# Patient Record
Sex: FEMALE | Race: WHITE | NOT HISPANIC OR LATINO | Employment: FULL TIME | URBAN - METROPOLITAN AREA
[De-identification: names, ages, dates, MRNs, and addresses within clinical notes are randomized per-mention and may not be internally consistent; named-entity substitution may affect disease eponyms.]

---

## 2019-06-14 ENCOUNTER — HOSPITAL ENCOUNTER (OUTPATIENT)
Dept: OTHER | Facility: HOSPITAL | Age: 24
Discharge: HOME | End: 2019-06-14

## 2019-08-01 ENCOUNTER — HOSPITAL ENCOUNTER (OUTPATIENT)
Dept: OTHER | Facility: HOSPITAL | Age: 24
Discharge: HOME | End: 2019-08-01
Attending: NURSE PRACTITIONER

## 2020-02-13 ENCOUNTER — OFFICE VISIT CONVERTED (OUTPATIENT)
Dept: INTERNAL MEDICINE | Facility: CLINIC | Age: 25
End: 2020-02-13
Attending: NURSE PRACTITIONER

## 2020-02-19 ENCOUNTER — OFFICE VISIT CONVERTED (OUTPATIENT)
Dept: INTERNAL MEDICINE | Facility: CLINIC | Age: 25
End: 2020-02-19
Attending: NURSE PRACTITIONER

## 2020-03-24 ENCOUNTER — HOSPITAL ENCOUNTER (OUTPATIENT)
Dept: OTHER | Facility: HOSPITAL | Age: 25
Discharge: HOME | End: 2020-03-24
Attending: NURSE PRACTITIONER

## 2020-03-24 ENCOUNTER — OFFICE VISIT CONVERTED (OUTPATIENT)
Dept: INTERNAL MEDICINE | Facility: CLINIC | Age: 25
End: 2020-03-24
Attending: NURSE PRACTITIONER

## 2020-03-26 LAB — BACTERIA SPEC AEROBE CULT: NORMAL

## 2020-06-15 ENCOUNTER — CONVERSION ENCOUNTER (OUTPATIENT)
Dept: INTERNAL MEDICINE | Facility: CLINIC | Age: 25
End: 2020-06-15

## 2020-06-15 ENCOUNTER — OFFICE VISIT CONVERTED (OUTPATIENT)
Dept: INTERNAL MEDICINE | Facility: CLINIC | Age: 25
End: 2020-06-15
Attending: INTERNAL MEDICINE

## 2020-09-10 ENCOUNTER — OFFICE VISIT CONVERTED (OUTPATIENT)
Dept: INTERNAL MEDICINE | Facility: CLINIC | Age: 25
End: 2020-09-10
Attending: PHYSICIAN ASSISTANT

## 2021-01-09 ENCOUNTER — HOSPITAL ENCOUNTER (OUTPATIENT)
Dept: URGENT CARE | Facility: CLINIC | Age: 26
Discharge: HOME | End: 2021-01-09
Attending: PHYSICIAN ASSISTANT

## 2021-01-11 LAB — SARS-COV-2 RNA SPEC QL NAA+PROBE: DETECTED

## 2021-01-26 ENCOUNTER — HOSPITAL ENCOUNTER (OUTPATIENT)
Dept: OTHER | Facility: HOSPITAL | Age: 26
Discharge: HOME | End: 2021-01-26
Attending: PHYSICIAN ASSISTANT

## 2021-01-26 ENCOUNTER — OFFICE VISIT CONVERTED (OUTPATIENT)
Dept: INTERNAL MEDICINE | Facility: CLINIC | Age: 26
End: 2021-01-26
Attending: PHYSICIAN ASSISTANT

## 2021-01-26 LAB
ALBUMIN SERPL-MCNC: 4.2 G/DL (ref 3.5–5)
ALBUMIN/GLOB SERPL: 1.6 {RATIO} (ref 1.4–2.6)
ALP SERPL-CCNC: 70 U/L (ref 42–98)
ALT SERPL-CCNC: 12 U/L (ref 10–40)
ANION GAP SERPL CALC-SCNC: 14 MMOL/L (ref 8–19)
AST SERPL-CCNC: 21 U/L (ref 15–50)
BASOPHILS # BLD AUTO: 0.06 10*3/UL (ref 0–0.2)
BASOPHILS NFR BLD AUTO: 0.8 % (ref 0–3)
BILIRUB SERPL-MCNC: 0.53 MG/DL (ref 0.2–1.3)
BUN SERPL-MCNC: 7 MG/DL (ref 5–25)
BUN/CREAT SERPL: 10 {RATIO} (ref 6–20)
CALCIUM SERPL-MCNC: 9 MG/DL (ref 8.7–10.4)
CHLORIDE SERPL-SCNC: 102 MMOL/L (ref 99–111)
CONV ABS IMM GRAN: 0.01 10*3/UL (ref 0–0.2)
CONV CO2: 28 MMOL/L (ref 22–32)
CONV IMMATURE GRAN: 0.1 % (ref 0–1.8)
CONV TOTAL PROTEIN: 6.8 G/DL (ref 6.3–8.2)
CREAT UR-MCNC: 0.72 MG/DL (ref 0.5–0.9)
DEPRECATED RDW RBC AUTO: 41.3 FL (ref 36.4–46.3)
EOSINOPHIL # BLD AUTO: 0.19 10*3/UL (ref 0–0.7)
EOSINOPHIL # BLD AUTO: 2.6 % (ref 0–7)
ERYTHROCYTE [DISTWIDTH] IN BLOOD BY AUTOMATED COUNT: 13.1 % (ref 11.7–14.4)
GFR SERPLBLD BASED ON 1.73 SQ M-ARVRAT: >60 ML/MIN/{1.73_M2}
GLOBULIN UR ELPH-MCNC: 2.6 G/DL (ref 2–3.5)
GLUCOSE SERPL-MCNC: 85 MG/DL (ref 65–99)
HCT VFR BLD AUTO: 36.3 % (ref 37–47)
HGB BLD-MCNC: 11.9 G/DL (ref 12–16)
LYMPHOCYTES # BLD AUTO: 2.65 10*3/UL (ref 1–5)
LYMPHOCYTES NFR BLD AUTO: 36.8 % (ref 20–45)
MCH RBC QN AUTO: 28.7 PG (ref 27–31)
MCHC RBC AUTO-ENTMCNC: 32.8 G/DL (ref 33–37)
MCV RBC AUTO: 87.7 FL (ref 81–99)
MONOCYTES # BLD AUTO: 0.52 10*3/UL (ref 0.2–1.2)
MONOCYTES NFR BLD AUTO: 7.2 % (ref 3–10)
NEUTROPHILS # BLD AUTO: 3.77 10*3/UL (ref 2–8)
NEUTROPHILS NFR BLD AUTO: 52.5 % (ref 30–85)
NRBC CBCN: 0 % (ref 0–0.7)
OSMOLALITY SERPL CALC.SUM OF ELEC: 287 MOSM/KG (ref 273–304)
PLATELET # BLD AUTO: 209 10*3/UL (ref 130–400)
PMV BLD AUTO: 11.3 FL (ref 9.4–12.3)
POTASSIUM SERPL-SCNC: 3.9 MMOL/L (ref 3.5–5.3)
RBC # BLD AUTO: 4.14 10*6/UL (ref 4.2–5.4)
SODIUM SERPL-SCNC: 140 MMOL/L (ref 135–147)
TSH SERPL-ACNC: 1.56 M[IU]/L (ref 0.27–4.2)
WBC # BLD AUTO: 7.2 10*3/UL (ref 4.8–10.8)

## 2021-01-27 LAB
IRON SATN MFR SERPL: 13 % (ref 20–55)
IRON SERPL-MCNC: 55 UG/DL (ref 60–170)
TIBC SERPL-MCNC: 412 UG/DL (ref 245–450)
TRANSFERRIN SERPL-MCNC: 288 MG/DL (ref 250–380)

## 2021-05-13 NOTE — PROGRESS NOTES
Progress Note      Patient Name: Paradise Wilson   Patient ID: [id number]   Sex: Female   YOB: 1995    Primary Care Provider: Ama GUZMÁN   Referring Provider: Ama GUZMÁN    Visit Date: Malissa 15, 2020    Provider: Michelle Medrano MD   Location: [facility]   Location Address: [address] [place] [address]  [id number]   Location Phone: [phone]          Chief Complaint  · Establish Care with       History Of Present Illness  Paradise Wilson is a 25 year old /White female who presents for evaluation and treatment of:      Establish care with Dr Medrano    Previously saw Ama [place]    Bell's Palsy resolved    2 children, 1 and 4, , lives in [place]   is a   2 dogs  works at [employer] in [place]           Past Medical History  Disease Name Date Onset Notes   ***No Significant Medical History --  --    Dysmenorrhea --  --          Past Surgical History  Procedure Name Date Notes   [place] Tooth Extraction --  --          Medication List  Name Date Started Instructions   Bromfed DM 2-30-10 mg/5 mL oral syrup 03/24/2020 take 10 milliliters by oral route every 4 hours as needed   Flonase Allergy Relief 50 mcg/actuation nasal spray,suspension 03/24/2020 spray 1 spray (50 mcg) in each nostril by intranasal route once daily   Zyrtec 10 mg oral tablet 03/24/2020 take 1 tablet (10 mg) by oral route once daily         Allergy List  Allergen Name Date Reaction Notes   NO KNOWN DRUG ALLERGIES --  --  --          Family Medical History  Disease Name Relative/Age Notes   Stroke Uncle/   --    Heart Disease Grandmother (maternal)/   --    Diabetes Mellitus, Type II Grandmother (maternal)/   --          Reproductive History  Menstrual   Last Menstrual Period: 10/09/2013 Method of Birth Control: OCPs   Pregnancy Summary   Total Pregnancies: 0 Full Term: 0 Premature: 0   Ab Induced: 0 Ab Spontaneous: 0 Ectopics: 0   Multiples:  0 Livin         Social History  Finding Status Start/Stop Quantity Notes   Alcohol Never 0/0 --  drinks no   Caffeine Current every day 0/0 --  drinks regularly; tea and soft drinks; 3-4 times per day   Second hand smoke exposure Never 0/0 --  no   Tobacco Never 0/0 --  never a smoker         Review of Systems  · Constitutional  o Denies  o : fever, fatigue, weight loss, weight gain  · Cardiovascular  o Denies  o : lower extremity edema, claudication, chest pressure, palpitations  · Respiratory  o Denies  o : shortness of breath, wheezing, cough, hemoptysis, dyspnea on exertion  · Gastrointestinal  o Denies  o : nausea, vomiting, diarrhea, constipation, abdominal pain      Vitals  Date Time BP Position Site L\R Cuff Size HR RR TEMP (F) WT  HT  BMI kg/m2 BSA m2 O2 Sat HC       2020 03:30 /68 Sitting    93 - R  98.3 152lbs 2oz 5'   29.71 1.71 99 %    2020 01:21 /62 Sitting    90 - R  97.4 157lbs 16oz 5'   30.86 1.74 100 %    06/15/2020 04:03 /64 Sitting    60 - R  97.7 151lbs 4oz 5'   29.54 1.7 100 %          Physical Examination  · Constitutional  o Appearance  o : no acute distress, well-nourished  · Head and Face  o Head  o :   § Inspection  § : atraumatic, normocephalic  · Eyes  o Eyes  o : extraocular movements intact, no scleral icterus, no conjunctival injection  · Ears, Nose, Mouth and Throat  o Ears  o :   § External Ears  § : normal  o Nose  o :   § Intranasal Exam  § : nares patent  o Oral Cavity  o :   § Oral Mucosa  § : moist mucous membranes  · Respiratory  o Respiratory Effort  o : breathing comfortably, symmetric chest rise  o Auscultation of Lungs  o : clear to asculatation bilaterally, no wheezes, rales, or rhonchii  · Cardiovascular  o Heart  o :   § Auscultation of Heart  § : regular rate and rhythm, no murmurs, rubs, or gallops  o Peripheral Vascular System  o :   § Extremities  § : no edema  · Neurologic  o Mental Status Examination  o :   § Orientation  § :  grossly oriented to person, place and time  o Gait and Station  o :   § Gait Screening  § : normal gait  · Psychiatric  o General  o : normal mood and affect          Assessment  · Dysmenorrhea     625.3/N94.6  · Establishing care with new doctor, encounter for     V65.8/Z76.89    Problems Reconciled  Plan  · Orders  o ACO-39: Current medications updated and reviewed () - - 06/15/2020  · Medications  o Medications have been Reconciled  o Transition of Care or Provider Policy  · Instructions  o Patient was educated/instructed on their diagnosis, treatment and medications prior to discharge from the clinic today.  o Call the office with any concerns or questions.  · Disposition  o Follow up in 1 year for annual exam or sooner if needed            Electronically Signed by: Michelle Medrano MD -Author on June 17, 2020 11:21:34 AM

## 2021-05-13 NOTE — PROGRESS NOTES
Progress Note      Patient Name: Praadise Wilson   Patient ID: [id number]   Sex: Female   YOB: 1995    Primary Care Provider: Ama GUZMÁN   Referring Provider: Ama GUZMÁN    Visit Date: September 10, 2020    Provider: Janeth Carrington PA-C   [facility] [facility]   Location Address: [address] [place] [address], [facility]  [place], [place]  [id number]   Location Phone: [phone]          Chief Complaint  · possible allergic reaction      History Of Present Illness  Paradise Wilson is a 25 year old /White female who presents for evaluation and treatment of:      hives that started 2 days ago. She states she was at work and didn't notice it at first because she just had itching under her bra line on the right side. She states she was itchy and noticed the next morning that her whole right side was broken out. She states it has been spreading from her right torso down her right leg. She has been using a Hydrocortisone cream OTC and taking Benadryl as needed, but it is not helping. She states the Hydrocortisone seems like it's making it worse and burns when she's putting it on. She states the only thing she can think of that has been changed recently is her detergent. She said she ordered detergent online and ended up with a different one than she usually uses. She states when she scratches it she thinks it makes it worse. She states she feels like she is really dehydrated. She states she has been really thirsty. She has children and said they don't have a rash, but that her daughter has a reaction to certain detergents. Denies fever, swelling of lips and tongue, previous allergic reaction. Denies sob, wheezing, resp distress, cp. Denies changes in diet, changes in body wash/shampoo, new clothes, no recent outdoor activities.       Past Medical History  Disease Name Date Onset Notes   ***No Significant Medical History --  --    Dysmenorrhea --  --          Past Surgical  History  Procedure Name Date Notes   Tubal ligation  --    [place] Tooth Extraction --  --          Medication List  Name Date Started Instructions   Flonase Allergy Relief 50 mcg/actuation nasal spray,suspension 2020 spray 1 spray (50 mcg) in each nostril by intranasal route once daily         Allergy List  Allergen Name Date Reaction Notes   NO KNOWN DRUG ALLERGIES --  --  --        Allergies Reconciled  Family Medical History  Disease Name Relative/Age Notes   Stroke Uncle/   --    Heart Disease Grandmother (maternal)/   --    Diabetes Mellitus, Type II Grandmother (maternal)/   --          Reproductive History  Menstrual   Last Menstrual Period: 10/09/2013 Method of Birth Control: OCPs   Pregnancy Summary   Total Pregnancies: 0 Full Term: 0 Premature: 0   Ab Induced: 0 Ab Spontaneous: 0 Ectopics: 0   Multiples: 0 Livin         Social History  Finding Status Start/Stop Quantity Notes   Alcohol Never 0/0 --  drinks no   Caffeine Current every day 0/0 --  drinks regularly; tea and soft drinks; 3-4 times per day   Second hand smoke exposure Never 0/0 --  no   Tobacco Never 0/0 --  never a smoker         Review of Systems  · Constitutional  o Denies  o : fever, fatigue  · Eyes  o Denies  o : blurred vision, changes in vision  · HENT  o Denies  o : headaches, nasal congestion, sore throat  · Cardiovascular  o Denies  o : chest pain, palpitations  · Respiratory  o Denies  o : shortness of breath, frequent cough  · Gastrointestinal  o Denies  o : nausea, vomiting, diarrhea, constipation, abdominal pain  · Genitourinary  o Denies  o : urinary urgency, dysuria  · Integument  o Admits  o : rash, itching, pigment changes  · Neurologic  o Denies  o : tingling or numbness, dizziness  · Musculoskeletal  o Denies  o : joint pain, limited range of motion, hip pain      Vitals  Date Time BP Position Site L\R Cuff Size HR RR TEMP (F) WT  HT  BMI kg/m2 BSA m2 O2 Sat HC       2020 01:21 /62 Sitting    90 - R   97.4 157lbs 16oz 5'   30.86 1.74 100 %    06/15/2020 04:03 /64 Sitting    60 - R  97.7 151lbs 4oz 5'   29.54 1.7 100 %    09/10/2020 02:46 /64 Sitting    58 - R 15 98.3 152lbs 4oz 5'   29.73 1.71 100 %          Physical Examination  · Constitutional  o Appearance  o : no acute distress, well-nourished  · Head and Face  o Head  o :   § Inspection  § : atraumatic, normocephalic  · Eyes  o Eyes  o : extraocular movements intact, no scleral icterus, no conjunctival injection  · Ears, Nose, Mouth and Throat  o Ears  o :   § External Ears  § : normal  o Nose  o :   § Intranasal Exam  § : nares patent  o Oral Cavity  o :   § Oral Mucosa  § : moist mucous membranes  · Respiratory  o Respiratory Effort  o : breathing comfortably, symmetric chest rise  o Auscultation of Lungs  o : clear to asculatation bilaterally, no wheezes, rales, or rhonchii  · Cardiovascular  o Heart  o :   § Auscultation of Heart  § : regular rate and rhythm, no murmurs, rubs, or gallops  o Peripheral Vascular System  o :   § Extremities  § : no edema  · Gastrointestinal  o Abdominal Examination  o :   § Abdomen  § : bowel sounds present, non-distended, non-tender  · Skin and Subcutaneous Tissue  o General Inspection  o : various size erythematous urticaria noted along R side of abdomen and R leg  · Neurologic  o Mental Status Examination  o :   § Orientation  § : grossly oriented to person, place and time  o Gait and Station  o :   § Gait Screening  § : normal gait  · Psychiatric  o General  o : normal mood and affect              Assessment  · Rash and nonspecific skin eruption     782.1/R21  Discussed symptoms with patient. Prescribed Solumedrol injection for patient in clinic today. Encouraged patient to take oral Prednisone over the weekend if her rash has not cleared up by Saturday this weekend. Cont PO benadryl prn itching. Patient will call if symptoms have not improved with conservative tx. Patient understood and agreed with plan.    · Urticaria     708.9/L50.9    Problems Reconciled  Plan  · Orders  o IM/SQ - Injection Fee [facility] ([id number]) - - 09/10/2020  o ACO-39: Current medications updated and reviewed () - - 09/10/2020  o Solumedrol 125 () - - 09/10/2020   Injection - Solu-Medrol 125 mg; Dose: 125 mg; Site: Right Hip; Route: intramuscular; Date: 09/10/2020 15:25:55; Exp: 07/31/2022; Lot: [id number]; Mfg: [employer]; TradeName: Solu-Medrol; Location: [facility]; Administered By: Liss Amador MA; Comment: patient tolerated well, left office in stable condition  · Medications  o prednisone 20 mg oral tablet   SIG: take 2 tablets (40 mg) by oral route once daily for 5 days   DISP: (10) tablets with 0 refills  Prescribed on 09/10/2020     o Medications have been Reconciled  o Transition of Care or Provider Policy  · Instructions  o Handouts were given to patient: hives  o Take all medications as prescribed/directed.  o Patient was educated/instructed on their diagnosis, treatment and medications prior to discharge from the clinic today.  o Call the office with any concerns or questions.  o Electronically Identified Patient Education Materials Provided Electronically  · Disposition  o Call or Return if symptoms worsen or persist.  o Prescriptions sent electronically            Electronically Signed by: Janeth Carrington PA-C -Author on September 10, 2020 04:25:25 PM

## 2021-05-14 VITALS
OXYGEN SATURATION: 99 % | TEMPERATURE: 98 F | BODY MASS INDEX: 29.25 KG/M2 | HEIGHT: 60 IN | DIASTOLIC BLOOD PRESSURE: 64 MMHG | SYSTOLIC BLOOD PRESSURE: 116 MMHG | WEIGHT: 149 LBS | HEART RATE: 80 BPM

## 2021-05-14 VITALS
WEIGHT: 152.25 LBS | HEART RATE: 58 BPM | SYSTOLIC BLOOD PRESSURE: 112 MMHG | BODY MASS INDEX: 29.89 KG/M2 | RESPIRATION RATE: 15 BRPM | TEMPERATURE: 98.3 F | OXYGEN SATURATION: 100 % | DIASTOLIC BLOOD PRESSURE: 64 MMHG | HEIGHT: 60 IN

## 2021-05-14 NOTE — PROGRESS NOTES
"   Progress Note      Patient Name: Paradise Wilson   Patient ID: [id number]   Sex: Female   YOB: 1995    Primary Care Provider: Ama GUZMÁN   Referring Provider: Ama GUZMÁN    Visit Date: January 26, 2021    Provider: Anali Bueno PA-C   [place] [facility]   Location Address: [address] [place] [address]  [id number]   Location Phone: [phone]          Chief Complaint  · Back pain since having COVID   · \"er on friday, pins and needles in head to left arm, cramp in back of head\"      History Of Present Illness  Paradise Wilson is a 25 year old /White female who presents for evaluation and treatment of:      Headache.  Patient states that approximately 3 weeks ago she was diagnosed with Covid.  She states approximately 5 days ago she developed numbness and tingling on the left side of her face and down her left arm.  She was seen in the ER.  CT and MRI of brain was done in ER which came back normal.  She was slightly anemic but no other lab abnormalities.  Patient states that her symptoms have improved some but she still has some numbness and tingling mostly on the parietal region on the left side of her head.  Patient does admit to having Bell's palsy on the left side of her face last year.  Her symptoms resolved after a couple weeks.  She has had no other trouble with it since.  Patient states that she has a history of migraines and headaches.  She usually will get a headache at least every other day.  She has taken Tylenol and ibuprofen which did not seem to help much.  She denies visual changes.  She has seen an eye doctor within the past year.  No weakness in her legs.       Past Medical History  Disease Name Date Onset Notes   ***No Significant Medical History --  --    Dysmenorrhea --  --          Past Surgical History  Procedure Name Date Notes   Tubal ligation 2018 --    [place] Tooth Extraction --  --          Medication " List  Name Date Started Instructions   ferrous sulfate 325 mg (65 mg iron) oral tablet 2021 take 1 tablet (325 mg) by oral route once daily   Flonase Allergy Relief 50 mcg/actuation nasal spray,suspension 2020 spray 1 spray (50 mcg) in each nostril by intranasal route once daily   sumatriptan succinate 50 mg oral tablet 2021 take 1 tab po after onset of migraine; may repeat after 2 hrs if headache returns, not to exceed 200mg in 24hrs         Allergy List  Allergen Name Date Reaction Notes   NO KNOWN DRUG ALLERGIES --  --  --          Family Medical History  Disease Name Relative/Age Notes   Stroke Uncle/   --    Heart Disease Grandmother (maternal)/   --    Diabetes Mellitus, Type II Grandmother (maternal)/   --          Reproductive History  Menstrual   Last Menstrual Period: 10/09/2013 Method of Birth Control: OCPs   Pregnancy Summary   Total Pregnancies: 0 Full Term: 0 Premature: 0   Ab Induced: 0 Ab Spontaneous: 0 Ectopics: 0   Multiples: 0 Livin         Social History  Finding Status Start/Stop Quantity Notes   Alcohol Never 0/0 --  drinks no   Caffeine Current every day 0/0 --  drinks regularly; tea and soft drinks; 3-4 times per day   Second hand smoke exposure Never 0/0 --  no   Tobacco Never 0/0 --  never a smoker         Review of Systems  · Constitutional  o Denies  o : fever, fatigue, weight loss, weight gain  · HENT  o Admits  o : headaches  · Cardiovascular  o Denies  o : lower extremity edema, claudication, chest pressure, palpitations  · Respiratory  o Denies  o : shortness of breath, wheezing, frequent cough, hemoptysis, dyspnea on exertion  · Gastrointestinal  o Denies  o : nausea, vomiting, diarrhea, constipation, abdominal pain      Vitals  Date Time BP Position Site L\R Cuff Size HR RR TEMP (F) WT  HT  BMI kg/m2 BSA m2 O2 Sat FR L/min FiO2 HC       06/15/2020 04:03 /64 Sitting    60 - R  97.7 151lbs 4oz 5'   29.54 1.7 100 %      09/10/2020 02:46 /64 Sitting     58 - R 15 98.3 152lbs 4oz 5'   29.73 1.71 100 %      01/26/2021 03:45 /64 Sitting    80 - R  98 149lbs 0oz 5'   29.1 1.69 99 %  21%          Physical Examination  · Constitutional  o Appearance  o : no acute distress, well-nourished  · Head and Face  o Head  o :   § Inspection  § : atraumatic, normocephalic  · Eyes  o Eyes  o : extraocular movements intact, no scleral icterus, no conjunctival injection  · Ears, Nose, Mouth and Throat  o Ears  o :   § External Ears  § : normal  § Otoscopic Examination  § : tympanic membrane appearance within normal limits bilaterally  o Nose  o :   § Intranasal Exam  § : nares patent  o Oral Cavity  o :   § Oral Mucosa  § : moist mucous membranes  · Respiratory  o Respiratory Effort  o : breathing comfortably, symmetric chest rise  o Auscultation of Lungs  o : clear to asculatation bilaterally, no wheezes, rales, or rhonchii  · Cardiovascular  o Heart  o :   § Auscultation of Heart  § : regular rate and rhythm, no murmurs, rubs, or gallops  o Peripheral Vascular System  o :   § Extremities  § : no edema  · Lymphatic  o Neck  o : no lymphadenopathy present  · Skin and Subcutaneous Tissue  o General Inspection  o : no lesions present, no areas of discoloration, skin turgor normal  · Neurologic  o Mental Status Examination  o :   § Orientation  § : grossly oriented to person, place and time  o Gait and Station  o :   § Gait Screening  § : normal gait  · Psychiatric  o General  o : normal mood and affect              Assessment  · Paresthesia     782.0/R20.2  Also likely secondary to Covid infection, should be self-limiting. Will go ahead and check iron profile and thyroid as these were not checked in the ER. If symptoms persist or worsen she needs to return.  · Headache     784.0/R51.9  Headache does seem to be chronic for patient, however, likely exacerbated due to recent Covid infection. Would expect symptoms to improve on their own. However, since patient has had chronic  migraines will do a trial of Nurtec. If patient is having to take it more than 1-2 times a week will try a prophylactic medication to help with migraines.  · COVID-19     079.89/U07.1      Plan  · Orders  o CBC with Auto Diff [facility] ([id number]) - 782.0/R20.2, 784.0/R51.9, 079.89/U07.1 - 01/26/2021  o CMP [facility] ([id number]) - 782.0/R20.2, 784.0/R51.9, 079.89/U07.1 - 01/26/2021  o TSH [facility] ([id number]) - 782.0/R20.2, 784.0/R51.9, 079.89/U07.1 - 01/26/2021  o Iron Profile (Iron 78360 TIBC 67440 and Transferrin 72915) (IRONP) - 782.0/R20.2, 784.0/R51.9, 079.89/U07.1 - 01/26/2021  · Medications  o Nurtec ODT 75 mg oral tablet,disintegrating   SIG: place 1 tablet (75 mg) on top of tongue, allow to dissolve then swallow by translingual route once as needed for migraine; max 1 dose/24 hrs   DISP: (6) Tablet with 0 refills  Prescribed on 01/26/2021     o Medications have been Reconciled  o Transition of Care or Provider Policy  · Instructions  o Take all medications as prescribed/directed.  o Rest. Increase Fluids.  o Patient was educated/instructed on their diagnosis, treatment and medications prior to discharge from the clinic today.  o Patient instructed to seek medical attention urgently for new or worsening symptoms.  o Call the office with any concerns or questions.  · Disposition  o Call or Return if symptoms worsen or persist.  o follow up as needed  o follow up in 1 month  o Medications sent electronically to pharmacy  o Labs drawn in clinic            Electronically Signed by: Anali Bueno PA-C -Author on February 17, 2021 09:09:31 AM

## 2021-05-15 VITALS
BODY MASS INDEX: 31.02 KG/M2 | TEMPERATURE: 97.4 F | SYSTOLIC BLOOD PRESSURE: 116 MMHG | HEIGHT: 60 IN | OXYGEN SATURATION: 100 % | WEIGHT: 158 LBS | HEART RATE: 90 BPM | DIASTOLIC BLOOD PRESSURE: 62 MMHG

## 2021-05-15 VITALS
WEIGHT: 151.25 LBS | HEIGHT: 60 IN | SYSTOLIC BLOOD PRESSURE: 110 MMHG | TEMPERATURE: 97.7 F | BODY MASS INDEX: 29.7 KG/M2 | HEART RATE: 60 BPM | OXYGEN SATURATION: 100 % | DIASTOLIC BLOOD PRESSURE: 64 MMHG

## 2021-05-15 VITALS
DIASTOLIC BLOOD PRESSURE: 72 MMHG | SYSTOLIC BLOOD PRESSURE: 104 MMHG | OXYGEN SATURATION: 100 % | HEIGHT: 60 IN | HEART RATE: 108 BPM | WEIGHT: 149.25 LBS | TEMPERATURE: 98.4 F | BODY MASS INDEX: 29.3 KG/M2

## 2021-05-15 VITALS
BODY MASS INDEX: 29.86 KG/M2 | TEMPERATURE: 98.3 F | OXYGEN SATURATION: 99 % | SYSTOLIC BLOOD PRESSURE: 130 MMHG | HEIGHT: 60 IN | DIASTOLIC BLOOD PRESSURE: 68 MMHG | WEIGHT: 152.12 LBS | HEART RATE: 93 BPM

## 2021-05-25 ENCOUNTER — HOSPITAL ENCOUNTER (OUTPATIENT)
Dept: OTHER | Facility: HOSPITAL | Age: 26
Discharge: HOME | End: 2021-05-25
Attending: NURSE PRACTITIONER

## 2021-05-25 ENCOUNTER — CONVERSION ENCOUNTER (OUTPATIENT)
Dept: INTERNAL MEDICINE | Facility: CLINIC | Age: 26
End: 2021-05-25

## 2021-05-25 ENCOUNTER — OFFICE VISIT CONVERTED (OUTPATIENT)
Dept: INTERNAL MEDICINE | Facility: CLINIC | Age: 26
End: 2021-05-25
Attending: NURSE PRACTITIONER

## 2021-05-25 LAB — B-HEM STREP SPEC QL CULT: NEGATIVE

## 2021-05-27 LAB — BACTERIA SPEC AEROBE CULT: NORMAL

## 2021-06-05 NOTE — PROGRESS NOTES
"   Progress Note      Patient Name: Paradise Wilson   Patient ID: [id number]   Sex: Female   YOB: 1995    Primary Care Provider: Ama GUZMÁN   Referring Provider: Ama GUZMÁN    Visit Date: May 25, 2021    Provider: RAMIREZ CARLSON   Location: [facility]   Location Address: [address] [place] [address]  [id number]   Location Phone: [phone]          Chief Complaint  · \" Congestion , ears popping , pressure in right ear and soar throat since Sunday morning\"  · \" Anxiety issues , wanting to get on some meds\"  · \" Worrying all the time\"      History Of Present Illness  Paradise Wilson is a 25 year old /White female who presents for evaluation and treatment of:      Patient presents the office today for an acute visit related to anxiety and sore throat.    Patient reports that she has always been a worrier and stressing over minor things.  She has noticed over the past few months that has gotten worse.  She is very busy and overwhelmed with a 2-year-old and a 5-year-old working a full-time job and working overtime as well as being a full-time housewife.  She feels like she cannot go to bed and rest until all of the work is done.  She denies any suicidal homicidal ideations.  She has never been on medication for anxiety or depression.  She has completed PHQ-9 today with a score of 3.    Patient has had a sore throat for 2 days she reports going to holiday well on Sunday and the sore throat becoming worse.  Denies body aches, chills, headache, nausea, vomiting, abdominal pain.  No exposure to anyone ill that she knows of.  Denies fever.  Hydrating well.       Past Medical History  Disease Name Date Onset Notes   ***No Significant Medical History --  --    Dysmenorrhea --  --          Past Surgical History  Procedure Name Date Notes   Tubal ligation 2018 --    [place] Tooth Extraction --  --          Medication List  Name Date Started " Instructions   Flonase Allergy Relief 50 mcg/actuation nasal spray,suspension 2020 spray 1 spray (50 mcg) in each nostril by intranasal route once daily         Allergy List  Allergen Name Date Reaction Notes   NO KNOWN DRUG ALLERGIES --  --  --        Allergies Reconciled  Family Medical History  Disease Name Relative/Age Notes   Stroke Uncle/   --    Heart Disease Grandmother (maternal)/   --    Diabetes Mellitus, Type II Grandmother (maternal)/   --          Reproductive History  Menstrual   Last Menstrual Period: 10/09/2013 Method of Birth Control: OCPs   Pregnancy Summary   Total Pregnancies: 0 Full Term: 0 Premature: 0   Ab Induced: 0 Ab Spontaneous: 0 Ectopics: 0   Multiples: 0 Livin         Social History  Finding Status Start/Stop Quantity Notes   Alcohol Never 0/0 --  drinks no   Caffeine Current every day 0 --  drinks regularly; tea and soft drinks; 3-4 times per day   Second hand smoke exposure Never 0/0 --  no   Tobacco Never 00 --  never a smoker         Review of Systems  · Constitutional  o Denies  o : fever, fatigue, weight loss, weight gain  · HENT  o Admits  o : sore throat  o Denies  o : headaches, sinus pain, nasal congestion, nasal discharge, postnasal drip, ear pain, ear fullness, oral lesions  · Cardiovascular  o Denies  o : lower extremity edema, claudication, chest pressure, palpitations  · Respiratory  o Denies  o : shortness of breath, wheezing, cough, hemoptysis, dyspnea on exertion  · Gastrointestinal  o Denies  o : nausea, vomiting, diarrhea, constipation, abdominal pain  · Psychiatric  o Admits  o : anxiety  o Denies  o : depression, impulsive behaviors, suicidal ideation, homicidal ideation, excessive anger      Vitals  Date Time BP Position Site L\R Cuff Size HR RR TEMP (F) WT  HT  BMI kg/m2 BSA m2 O2 Sat FR L/min FiO2 HC       06/15/2020 04:03 /64 Sitting    60 - R  97.7 151lbs 4oz 5'   29.54 1.7 100 %      09/10/2020 02:46 /64 Sitting    58 - R 15 98.3  152lbs 4oz 5'   29.73 1.71 100 %      01/26/2021 03:45 /64 Sitting    80 - R  98 149lbs 0oz 5'   29.1 1.69 99 %  21%    05/25/2021 03:45 /70 Sitting    85 - R  98.9 161lbs 0oz 5'   31.44 1.76 100 %  21%          Physical Examination  · Constitutional  o Appearance  o : no acute distress, well-nourished  · Head and Face  o Head  o :   § Inspection  § : atraumatic, normocephalic  · Eyes  o Eyes  o : extraocular movements intact, no scleral icterus, no conjunctival injection  · Ears, Nose, Mouth and Throat  o Ears  o :   § External Ears  § : normal  § Otoscopic Examination  § : tympanic membrane appearance within normal limits bilaterally  o Nose  o :   § Intranasal Exam  § : nares patent  o Oral Cavity  o :   § Oral Mucosa  § : moist mucous membranes  o Throat  o :   § Oropharynx  § : oropharynx inflammation present, tonsils within normal limits  · Respiratory  o Respiratory Effort  o : breathing comfortably, symmetric chest rise  o Auscultation of Lungs  o : clear to asculatation bilaterally, no wheezes, rales, or rhonchii  · Cardiovascular  o Heart  o :   § Auscultation of Heart  § : regular rate and rhythm, no murmurs, rubs, or gallops  o Peripheral Vascular System  o :   § Extremities  § : no edema  · Gastrointestinal  o Abdominal Examination  o :   § Abdomen  § : bowel sounds present, non-distended, non-tender  · Lymphatic  o Neck  o : no lymphadenopathy present  o Supraclavicular Nodes  o : no supraclavicular nodes  · Neurologic  o Mental Status Examination  o :   § Orientation  § : grossly oriented to person, place and time  o Gait and Station  o :   § Gait Screening  § : normal gait  · Psychiatric  o General  o : normal mood and affect          Results  · In-Office Procedures  o Lab procedure  § IOP - Rapid Strep (29276)   § Beta Strep Gp A Culture: Negative   § Internal Control Verified?: Yes       Assessment  · Screening for depression     V79.0/Z13.89  PHQ-9 score 3  · Anxiety  disorder     300.00/F41.9  Discussed poor control over anxiety/depression. Discussed SSRI, educated that this may take 6-12 weeks to take full effect. Discussed side effect that could occur including increased risk for SI/HI, sexual dysfunction, and weight gain. Will start Trintellix at this time. 2 samples given. Follow up scheduled in 6 weeks, discussed the option of phone call. Educated to the ER if develops SI/HI.    Problems Reconciled  Plan  · Orders  o ACO-18: Negative screen for clinical depression using a standardized tool () - V79.0/Z13.89 - 05/25/2021   PHQ-9 3  o ACO-39: Current medications updated and reviewed (, 1159F) - - 05/25/2021  · Medications  o Trintellix 5 mg oral tablet   SIG: take 1 tablet (5 mg) by oral route once daily at the same time each day   DISP: (30) Tablet with 1 refills  Prescribed on 05/25/2021     o Medications have been Reconciled  o Transition of Care or Provider Policy  · Instructions  o Depression Screen completed and scanned into the EMR under the designated folder within the patient's documents.  o Today's PHQ-9 result is _3__  o A list of local qualified behavioral health care centers, psychologists, and psychiatrists were given to the patient at the time of the visit today.  o The provider screening met the required time of 15 minutes.  o Discussed the need for therapy, either with a certified counselor, psychologist, and/or family . If no improvement is noted or worsening of their condition, return to office or ER. But also discussed with patient that if they are non-responsive to the type of medication they may need to see a psychiatrist for further evaluation and management.  o Patient was given an SSRI/SSNRI medication and warned of possible side effects of the medication including potential for increased risk of suicidal thoughts and feelings. Patient was instructed that if they begin to exhibit any of these effects they will discontinue the  medication immediately and contact our office or the ER ASAP.  o Patient was educated/instructed on their diagnosis, treatment and medications prior to discharge from the clinic today.  o Call the office with any concerns or questions.  · Disposition  o Call or Return if symptoms worsen or persist.  o Meds sent to pharmacy  o 6 week follow up            Electronically Signed by: RAMIREZ CARLSON -Author on May 25, 2021 04:28:06 PM

## 2021-07-03 ENCOUNTER — LAB (OUTPATIENT)
Dept: LAB | Facility: HOSPITAL | Age: 26
End: 2021-07-03

## 2021-07-03 ENCOUNTER — TRANSCRIBE ORDERS (OUTPATIENT)
Dept: ADMINISTRATIVE | Facility: HOSPITAL | Age: 26
End: 2021-07-03

## 2021-07-03 DIAGNOSIS — E61.1 IRON DEFICIENCY: ICD-10-CM

## 2021-07-03 DIAGNOSIS — E55.9 VITAMIN D DEFICIENCY DISEASE: ICD-10-CM

## 2021-07-03 DIAGNOSIS — E11.9 DIABETES MELLITUS WITHOUT COMPLICATION (HCC): ICD-10-CM

## 2021-07-03 DIAGNOSIS — E03.9 HYPOTHYROIDISM, ADULT: ICD-10-CM

## 2021-07-03 DIAGNOSIS — M79.2 PERIPHERAL NEURALGIA: ICD-10-CM

## 2021-07-03 DIAGNOSIS — E55.9 VITAMIN D DEFICIENCY DISEASE: Primary | ICD-10-CM

## 2021-07-03 LAB
25(OH)D3 SERPL-MCNC: 43.3 NG/ML (ref 30–100)
CEA SERPL-MCNC: 1.32 NG/ML
FOLATE SERPL-MCNC: >20 NG/ML (ref 4.78–24.2)
GLUCOSE BLD-MCNC: 80 MG/DL (ref 74–155)
GLUCOSE P FAST SERPL-MCNC: 97 MG/DL (ref 74–106)
HBA1C MFR BLD: 5.13 % (ref 4.8–5.6)
HCT VFR BLD AUTO: 37.1 % (ref 34–46.6)
HGB BLD-MCNC: 12.5 G/DL (ref 12–15.9)
T4 SERPL-MCNC: 6.38 MCG/DL (ref 4.5–11.7)
TSH SERPL DL<=0.05 MIU/L-ACNC: 2.14 UIU/ML (ref 0.27–4.2)
VIT B12 BLD-MCNC: 289 PG/ML (ref 211–946)

## 2021-07-03 PROCEDURE — 86235 NUCLEAR ANTIGEN ANTIBODY: CPT

## 2021-07-03 PROCEDURE — 82607 VITAMIN B-12: CPT

## 2021-07-03 PROCEDURE — 86225 DNA ANTIBODY NATIVE: CPT

## 2021-07-03 PROCEDURE — 86038 ANTINUCLEAR ANTIBODIES: CPT

## 2021-07-03 PROCEDURE — 83516 IMMUNOASSAY NONANTIBODY: CPT

## 2021-07-03 PROCEDURE — 82306 VITAMIN D 25 HYDROXY: CPT

## 2021-07-03 PROCEDURE — 82947 ASSAY GLUCOSE BLOOD QUANT: CPT

## 2021-07-03 PROCEDURE — 84443 ASSAY THYROID STIM HORMONE: CPT

## 2021-07-03 PROCEDURE — 83655 ASSAY OF LEAD: CPT

## 2021-07-03 PROCEDURE — 82175 ASSAY OF ARSENIC: CPT

## 2021-07-03 PROCEDURE — 36415 COLL VENOUS BLD VENIPUNCTURE: CPT

## 2021-07-03 PROCEDURE — 85018 HEMOGLOBIN: CPT

## 2021-07-03 PROCEDURE — 82378 CARCINOEMBRYONIC ANTIGEN: CPT

## 2021-07-03 PROCEDURE — 84436 ASSAY OF TOTAL THYROXINE: CPT

## 2021-07-03 PROCEDURE — 83036 HEMOGLOBIN GLYCOSYLATED A1C: CPT

## 2021-07-03 PROCEDURE — 85014 HEMATOCRIT: CPT

## 2021-07-03 PROCEDURE — 82746 ASSAY OF FOLIC ACID SERUM: CPT

## 2021-07-03 PROCEDURE — 83825 ASSAY OF MERCURY: CPT

## 2021-07-07 ENCOUNTER — OFFICE VISIT (OUTPATIENT)
Dept: INTERNAL MEDICINE | Facility: CLINIC | Age: 26
End: 2021-07-07

## 2021-07-07 VITALS
SYSTOLIC BLOOD PRESSURE: 114 MMHG | BODY MASS INDEX: 31.8 KG/M2 | OXYGEN SATURATION: 99 % | TEMPERATURE: 97.3 F | WEIGHT: 162 LBS | HEIGHT: 60 IN | DIASTOLIC BLOOD PRESSURE: 72 MMHG | HEART RATE: 60 BPM

## 2021-07-07 DIAGNOSIS — F41.9 ANXIETY: Primary | ICD-10-CM

## 2021-07-07 LAB
CENTROMERE B AB SER-ACNC: <0.2 AI (ref 0–0.9)
CHROMATIN AB SERPL-ACNC: <0.2 AI (ref 0–0.9)
DSDNA AB SER-ACNC: 3 IU/ML (ref 0–9)
ENA JO1 AB SER-ACNC: <0.2 AI (ref 0–0.9)
ENA RNP AB SER-ACNC: 0.2 AI (ref 0–0.9)
ENA SCL70 AB SER-ACNC: <0.2 AI (ref 0–0.9)
ENA SM AB SER-ACNC: <0.2 AI (ref 0–0.9)
ENA SM+RNP AB SER-ACNC: <0.2 AI (ref 0–0.9)
ENA SS-A AB SER-ACNC: 0.7 AI (ref 0–0.9)
ENA SS-B AB SER-ACNC: <0.2 AI (ref 0–0.9)
Lab: NORMAL
RIBOSOMAL P AB SER-ACNC: <0.2 AI (ref 0–0.9)

## 2021-07-07 PROCEDURE — 99213 OFFICE O/P EST LOW 20 MIN: CPT | Performed by: PHYSICIAN ASSISTANT

## 2021-07-07 RX ORDER — VORTIOXETINE 5 MG/1
5 TABLET, FILM COATED ORAL 2 TIMES DAILY
COMMUNITY
Start: 2021-05-26 | End: 2021-07-07 | Stop reason: ALTCHOICE

## 2021-07-07 RX ORDER — VORTIOXETINE 10 MG/1
10 TABLET, FILM COATED ORAL DAILY
Qty: 30 TABLET | Refills: 1 | Status: SHIPPED | OUTPATIENT
Start: 2021-07-07 | End: 2021-09-07 | Stop reason: SDUPTHER

## 2021-07-07 NOTE — PROGRESS NOTES
"Chief Complaint  Med Management (wants to increase dose on trintellix )    Subjective          Paradise Wilson presents to [facility]  Anxiety- has been on trintellix which has helped during the day but notices that her mind still races at nighttime and she is having trouble sleeping. No SI/HI.            Objective   Vital Signs:   /72   Pulse 60   Temp 97.3 °F (36.3 °C)   Ht 152.4 cm (60\")   Wt 73.5 kg (162 lb)   SpO2 99%   BMI 31.64 kg/m²     Physical Exam  Vitals reviewed.   Constitutional:       Appearance: Normal appearance. She is well-developed.   HENT:      Head: Normocephalic and atraumatic.      Right Ear: Tympanic membrane, ear canal and external ear normal.      Left Ear: Tympanic membrane, ear canal and external ear normal.      Mouth/Throat:      Pharynx: No oropharyngeal exudate.   Eyes:      Conjunctiva/sclera: Conjunctivae normal.      Pupils: Pupils are equal, round, and reactive to light.   Cardiovascular:      Rate and Rhythm: Normal rate and regular rhythm.      Heart sounds: No murmur heard.   No friction rub. No gallop.    Pulmonary:      Effort: Pulmonary effort is normal.      Breath sounds: Normal breath sounds. No wheezing or rhonchi.   Abdominal:      General: Bowel sounds are normal. There is no distension.      Palpations: Abdomen is soft.      Tenderness: There is no abdominal tenderness.   Skin:     General: Skin is warm and dry.   Neurological:      Mental Status: She is alert and oriented to person, place, and time.      Cranial Nerves: No cranial nerve deficit.   Psychiatric:         Mood and Affect: Mood and affect normal.         Behavior: Behavior normal.         Thought Content: Thought content normal.         Judgment: Judgment normal.        Result Review :          Procedures      Assessment and Plan    There are no diagnoses linked to this encounter.    Follow Up   No follow-ups on file.  Patient was given " instructions and counseling regarding her condition or for health maintenance advice. Please see specific information pulled into the AVS if appropriate.

## 2021-07-08 NOTE — ASSESSMENT & PLAN NOTE
Will increase Trintellix 5 mg to 10 mg daily.  Will have patient follow-up in 1 month.  Could consider adding or changing medication if patient is still having insomnia and racing thoughts.  No SI/HI.

## 2021-07-09 LAB
ARSENIC BLD-MCNC: 5 UG/L (ref 2–23)
LEAD BLDV-MCNC: 3 UG/DL (ref 0–4)
MERCURY BLD-MCNC: <1 UG/L (ref 0–14.9)

## 2021-07-15 VITALS
DIASTOLIC BLOOD PRESSURE: 70 MMHG | BODY MASS INDEX: 31.61 KG/M2 | SYSTOLIC BLOOD PRESSURE: 118 MMHG | OXYGEN SATURATION: 100 % | HEIGHT: 60 IN | TEMPERATURE: 98.9 F | HEART RATE: 85 BPM | WEIGHT: 161 LBS

## 2021-09-07 ENCOUNTER — TELEPHONE (OUTPATIENT)
Dept: INTERNAL MEDICINE | Facility: CLINIC | Age: 26
End: 2021-09-07

## 2021-09-07 DIAGNOSIS — F41.9 ANXIETY: ICD-10-CM

## 2021-09-07 RX ORDER — VORTIOXETINE 10 MG/1
10 TABLET, FILM COATED ORAL DAILY
Qty: 30 TABLET | Refills: 1 | Status: SHIPPED | OUTPATIENT
Start: 2021-09-07 | End: 2021-10-11 | Stop reason: SDUPTHER

## 2021-09-07 NOTE — TELEPHONE ENCOUNTER
Caller: Paradise Wilson    Relationship: Self    Best call back number: [phone]    Medication needed:   Requested Prescriptions     Pending Prescriptions Disp Refills   • Vortioxetine HBr (Trintellix) 10 MG tablet 30 tablet 1     Sig: Take 10 mg by mouth Daily.       When do you need the refill by: ASAP    What additional details did the patient provide when requesting the medication: PATIENT STATES THAT SHE DOES NOT HAVE ENOUGH OF THIS MEDICATION TO GET HER TO HER NEXT APPOITMENT ON 10/22 AND IS REQUESTING A REFILL TO HOLD HER OVER UNTIL THEN. PLEASE CALL PATIENT WITH AN UPDATE    Does the patient have less than a 3 day supply:  [x] Yes  [] No    What is the patient's preferred pharmacy: [facility] [address] [place] [address] [phone] [facility] [phone]

## 2021-09-09 NOTE — TELEPHONE ENCOUNTER
Caller: Paradsie Wilson    Relationship: Self    Best call back number: [phone]    Medication needed:   Requested Prescriptions     Signed Prescriptions Disp Refills   • Vortioxetine HBr (Trintellix) 10 MG tablet 30 tablet 1     Sig: Take 10 mg by mouth Daily.     Authorizing Provider: KAREN VARGAS     Ordering User: SUSANA THEODORE       When do you need the refill by:09/09/2021    What additional details did the patient provide when requesting the medication:   PATIENT ONLY HAS 4 PILLS LEFT OF MEDICATION    Does the patient have less than a 3 day supply:  [] Yes  [x] No    What is the patient's preferred pharmacy: [address] [phone] [facility] [phone]

## 2021-10-11 DIAGNOSIS — F41.9 ANXIETY: ICD-10-CM

## 2021-10-11 RX ORDER — VORTIOXETINE 10 MG/1
10 TABLET, FILM COATED ORAL DAILY
Qty: 30 TABLET | Refills: 1 | Status: SHIPPED | OUTPATIENT
Start: 2021-10-11 | End: 2021-10-22 | Stop reason: SDUPTHER

## 2021-10-11 NOTE — TELEPHONE ENCOUNTER
Caller: Paradise Wilson    Relationship: Self    Medication requested (name and dosage):Vortioxetine HBr (Trintellix) 10 MG tablet    [facility] [place] [facility] be sent[facility] [place] [id number], [place] - [address] [place] [address] - [phone]  - [phone] FX     Additional details provided by patient: PATIENT HAS UPCOMING APPT ON 10/22 HOWEVER SHE WILL BE RUNNING OUT OF HER MEDICATION PRIOR TO THAT. SHE IS REQUESTING A REFILL UNTIL THEN SO SHE DOES NOT RUN OUT.    Best call back number: [phone]     Does the patient have less than a 3 day supply:  [x] Yes  [] No    Kalen Banda Rep   10/11/21 10:48 EDT

## 2021-10-22 ENCOUNTER — OFFICE VISIT (OUTPATIENT)
Dept: INTERNAL MEDICINE | Facility: CLINIC | Age: 26
End: 2021-10-22

## 2021-10-22 VITALS
SYSTOLIC BLOOD PRESSURE: 112 MMHG | HEART RATE: 74 BPM | OXYGEN SATURATION: 98 % | TEMPERATURE: 98.7 F | BODY MASS INDEX: 33.41 KG/M2 | WEIGHT: 170.2 LBS | RESPIRATION RATE: 18 BRPM | DIASTOLIC BLOOD PRESSURE: 76 MMHG | HEIGHT: 60 IN

## 2021-10-22 DIAGNOSIS — F41.9 ANXIETY: ICD-10-CM

## 2021-10-22 PROCEDURE — 99213 OFFICE O/P EST LOW 20 MIN: CPT | Performed by: INTERNAL MEDICINE
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
## 2021-10-24 RX ORDER — VORTIOXETINE 10 MG/1
10 TABLET, FILM COATED ORAL DAILY
Qty: 90 TABLET | Refills: 1 | Status: SHIPPED | OUTPATIENT
Start: 2021-10-24 | End: 2021-11-30 | Stop reason: SDUPTHER

## 2021-11-30 DIAGNOSIS — F41.9 ANXIETY: ICD-10-CM

## 2021-11-30 RX ORDER — VORTIOXETINE 10 MG/1
10 TABLET, FILM COATED ORAL DAILY
Qty: 90 TABLET | Refills: 1 | Status: SHIPPED | OUTPATIENT
Start: 2021-11-30 | End: 2021-12-29 | Stop reason: SDUPTHER

## 2021-11-30 NOTE — TELEPHONE ENCOUNTER
Caller: Paradise Wilson    Relationship: Self    PATEINT STATED:  Best call back number: [phone]  REQUEST FOR CALL TO KNOW STATUS OF REFILL, VOICE MAIL ALRIGHT PER PATIENT     Requested Prescriptions:   Requested Prescriptions      No prescriptions requested or ordered in this encounter    Vortioxetine HBr (Trintellix) 10 MG tablet    Pharmacy where request should be sent:  [address] [phone]  - [phone] FX    Additional details provided by patient: HAS 4 DAYS REMAINING, HAS FOLLOW UP 1/25/22    Does the patient have less than a 3 day supply:  [] Yes  [x] No    Kalen DONOHUE Rep   11/30/21 10:43 EST

## 2021-12-29 ENCOUNTER — TELEPHONE (OUTPATIENT)
Dept: INTERNAL MEDICINE | Facility: CLINIC | Age: 26
End: 2021-12-29

## 2021-12-29 DIAGNOSIS — F41.9 ANXIETY: ICD-10-CM

## 2021-12-29 RX ORDER — VORTIOXETINE 10 MG/1
10 TABLET, FILM COATED ORAL DAILY
Qty: 90 TABLET | Refills: 1 | Status: SHIPPED | OUTPATIENT
Start: 2021-12-29 | End: 2022-01-28 | Stop reason: SDUPTHER

## 2021-12-29 NOTE — TELEPHONE ENCOUNTER
Caller: Paradise Wilson    Relationship: Self    Best call back number:[phone]    Requested Prescriptions:   Requested Prescriptions     Pending Prescriptions Disp Refills   • Vortioxetine HBr (Trintellix) 10 MG tablet 90 tablet 1     Sig: Take 10 mg by mouth Daily.        Pharmacy where request should be sent: [address] [phone] [facility] [phone] FX     Additional details provided by patient: PATIENT ONLY HAS 4 PILLS LEFT.    Does the patient have less than a 3 day supply:  [] Yes  [x] No    Kalen Joshi Rep   12/29/21 08:11 EST

## 2022-01-28 ENCOUNTER — TELEPHONE (OUTPATIENT)
Dept: INTERNAL MEDICINE | Facility: CLINIC | Age: 27
End: 2022-01-28

## 2022-01-28 DIAGNOSIS — F41.9 ANXIETY: ICD-10-CM

## 2022-01-28 RX ORDER — VORTIOXETINE 10 MG/1
10 TABLET, FILM COATED ORAL DAILY
Qty: 90 TABLET | Refills: 1 | Status: SHIPPED | OUTPATIENT
Start: 2022-01-28 | End: 2022-02-28 | Stop reason: SDUPTHER

## 2022-01-28 NOTE — TELEPHONE ENCOUNTER
Caller: Paradise Wilson    Relationship: Self    Best call back number: [phone] OKAY TO LEAVE MESSAGE ON PHONE    Requested Prescriptions:   Requested Prescriptions     Pending Prescriptions Disp Refills   • Vortioxetine HBr (Trintellix) 10 MG tablet 90 tablet 1     Sig: Take 10 mg by mouth Daily.        Pharmacy where request should be sent: [facility] [address] [phone] [facility] [phone] FX     Additional details provided by patient: PATIENT HAS A 3 DAY SUPPLY    Does the patient have less than a 3 day supply:  [] Yes  [x] No    Kalen Caldwell Rep   01/28/22 14:02 EST

## 2022-02-28 ENCOUNTER — TELEPHONE (OUTPATIENT)
Dept: INTERNAL MEDICINE | Facility: CLINIC | Age: 27
End: 2022-02-28

## 2022-02-28 DIAGNOSIS — F41.9 ANXIETY: ICD-10-CM

## 2022-02-28 RX ORDER — VORTIOXETINE 10 MG/1
10 TABLET, FILM COATED ORAL DAILY
Qty: 90 TABLET | Refills: 1 | Status: SHIPPED | OUTPATIENT
Start: 2022-02-28 | End: 2022-03-31 | Stop reason: SDUPTHER

## 2022-02-28 NOTE — TELEPHONE ENCOUNTER
Caller: Paradise Wilson    Relationship: Self    Best call back number: [phone]    Requested Prescriptions:   Requested Prescriptions     Pending Prescriptions Disp Refills   • Vortioxetine HBr (Trintellix) 10 MG tablet 90 tablet 1     Sig: Take 10 mg by mouth Daily.        Pharmacy where request should be sent: [address] [phone] [facility] [phone] FX     Additional details provided by patient: PATIENT IS NEEDING A REFILL. PLEASE CALL AND ADVISE. SHE ONLY HAS 2 DAYS LEFT.     Does the patient have less than a 3 day supply:  [x] Yes  [] No    Kalen Awad Rep   02/28/22 13:08 EST

## 2022-03-02 ENCOUNTER — OFFICE VISIT (OUTPATIENT)
Dept: INTERNAL MEDICINE | Facility: CLINIC | Age: 27
End: 2022-03-02

## 2022-03-02 VITALS
SYSTOLIC BLOOD PRESSURE: 116 MMHG | DIASTOLIC BLOOD PRESSURE: 82 MMHG | WEIGHT: 176.6 LBS | HEIGHT: 60 IN | BODY MASS INDEX: 34.67 KG/M2 | TEMPERATURE: 97.8 F | HEART RATE: 61 BPM | OXYGEN SATURATION: 100 %

## 2022-03-02 DIAGNOSIS — F41.9 ANXIETY: Primary | ICD-10-CM

## 2022-03-02 DIAGNOSIS — E66.9 OBESITY (BMI 30.0-34.9): ICD-10-CM

## 2022-03-02 PROCEDURE — 99213 OFFICE O/P EST LOW 20 MIN: CPT | Performed by: INTERNAL MEDICINE

## 2022-03-02 RX ORDER — SEMAGLUTIDE 0.25 MG/.5ML
0.25 INJECTION, SOLUTION SUBCUTANEOUS WEEKLY
Qty: 0.5 ML | Refills: 1 | Status: SHIPPED | OUTPATIENT
Start: 2022-03-02 | End: 2022-04-25

## 2022-03-02 NOTE — PROGRESS NOTES
"Chief Complaint  Medication Problem (lowered sex drive, weight gain)    Subjective          Paradise Wilson presents to [facility]  History of Present Illness    states that she feels much better on the trintellix  However notices that she has gained weight since being on it   Thinks her appetite is increased  For the last two weeks or so she has been going to the gym regularly  But hasn't noticed a lot of change yet    She has also noticed decreased libido    Objective   Vital Signs:   /82 (BP Location: Right arm, Patient Position: Sitting, Cuff Size: Adult)   Pulse 61   Temp 97.8 °F (36.6 °C) (Temporal)   Ht 152.4 cm (60\")   Wt 80.1 kg (176 lb 9.6 oz)   SpO2 100%   BMI 34.49 kg/m²     Physical Exam  Vitals reviewed.   Constitutional:       Appearance: Normal appearance. She is well-developed.   HENT:      Head: Normocephalic and atraumatic.      Right Ear: External ear normal.      Left Ear: External ear normal.   Eyes:      Conjunctiva/sclera: Conjunctivae normal.      Pupils: Pupils are equal, round, and reactive to light.   Cardiovascular:      Rate and Rhythm: Normal rate and regular rhythm.      Heart sounds: No murmur heard.  No friction rub. No gallop.    Pulmonary:      Effort: Pulmonary effort is normal.      Breath sounds: Normal breath sounds. No wheezing or rhonchi.   Skin:     General: Skin is warm and dry.   Neurological:      Mental Status: She is alert and oriented to person, place, and time.      Cranial Nerves: No cranial nerve deficit.   Psychiatric:         Mood and Affect: Affect normal.         Behavior: Behavior normal.         Thought Content: Thought content normal.        Result Review :       Common labs    Common Labsle 7/3/21 7/3/21    0811 0811   Hemoglobin 12.5    Hematocrit 37.1    Hemoglobin A1C  5.13             Results for orders placed or performed in visit on 07/03/21   Vitamin B12    Specimen: Blood   Result Value " Ref Range    Vitamin B-12 289 211 - 946 pg/mL   Folate    Specimen: Blood   Result Value Ref Range    Folate >20.00 4.78 - 24.20 ng/mL   T4    Specimen: Blood   Result Value Ref Range    T4, Total 6.38 4.50 - 11.70 mcg/dL   TSH    Specimen: Blood   Result Value Ref Range    TSH 2.140 0.270 - 4.200 uIU/mL   Hemoglobin & Hematocrit, Blood    Specimen: Blood   Result Value Ref Range    Hemoglobin 12.5 12.0 - 15.9 g/dL    Hematocrit 37.1 34.0 - 46.6 %   CEA    Specimen: Blood   Result Value Ref Range    CEA 1.32 ng/mL   Glucose, Fasting    Specimen: Blood   Result Value Ref Range    Glucose, Fasting 97 74 - 106 mg/dL   Hemoglobin A1c    Specimen: Blood   Result Value Ref Range    Hemoglobin A1C 5.13 4.80 - 5.60 %   Vitamin D 25 Hydroxy    Specimen: Blood   Result Value Ref Range    25 Hydroxy, Vitamin D 43.3 30.0 - 100.0 ng/ml   Heavy Metals, Blood    Specimen: Blood   Result Value Ref Range    Lead 3 0 - 4 ug/dL    Arsenic 5 2 - 23 ug/L    Mercury <1.0 0.0 - 14.9 ug/L   Glucose 2 Hour PP    Specimen: Blood   Result Value Ref Range    Glucose, 2 Hr PP 80 74 - 155 mg/dL   ROSALIA Comprehensive Plus Profile    Specimen: Blood   Result Value Ref Range    Anti-DNA (DS) Ab Qn 3 0 - 9 IU/mL    RNP Antibodies 0.2 0.0 - 0.9 AI    Wong Antibodies <0.2 0.0 - 0.9 AI    Wong/RNP Antibodies <0.2 0.0 - 0.9 AI    Antiscleroderma-70 Antibodies <0.2 0.0 - 0.9 AI    IDA SSA (RO) Ab 0.7 0.0 - 0.9 AI    IDA SSB (LA) Ab <0.2 0.0 - 0.9 AI    Antichromatin Antibodies <0.2 0.0 - 0.9 AI    Antiribosomal P Antibodies <0.2 0.0 - 0.9 AI    VANESSA-1 IgG <0.2 0.0 - 0.9 AI    Anti-Centromere B Antibodies <0.2 0.0 - 0.9 AI    See below: Comment             Procedures        Assessment and Plan    Diagnoses and all orders for this visit:    1. Anxiety (Primary)  Assessment & Plan:  Discussed several options: decrease trintellix to 5mg, stop trintellix and start wellbutrin or switch to something different like pristiq; also discussed trying a weight loss  medicine, but don't love treating a medication side effect with a different medication      2. Obesity (BMI 30.0-34.9)  Assessment & Plan:  will try ordering wegovy, discussed that insurance may not cover this; she will cont with diet and exercise.  if can't get this medicine she will try just diet and exercise      Other orders  -     Semaglutide-Weight Management (Wegovy) 0.25 MG/0.5ML solution auto-injector; Inject 0.25 mg under the skin into the appropriate area as directed 1 (One) Time Per Week.  Dispense: 0.5 mL; Refill: 1    24 min spent in discussion with pt about current issues.            Follow Up   Return in about 6 weeks (around 4/13/2022).  Patient was given instructions and counseling regarding her condition or for health maintenance advice. Please see specific information pulled into the AVS if appropriate.

## 2022-03-03 PROBLEM — E66.811 OBESITY (BMI 30.0-34.9): Status: ACTIVE | Noted: 2022-03-03

## 2022-03-03 PROBLEM — E66.9 OBESITY (BMI 30.0-34.9): Status: ACTIVE | Noted: 2022-03-03

## 2022-03-03 NOTE — ASSESSMENT & PLAN NOTE
Discussed several options: decrease trintellix to 5mg, stop trintellix and start wellbutrin or switch to something different like pristiq; also discussed trying a weight loss medicine, but don't love treating a medication side effect with a different medication

## 2022-03-03 NOTE — ASSESSMENT & PLAN NOTE
will try ordering wegovy, discussed that insurance may not cover this; she will cont with diet and exercise.  if can't get this medicine she will try just diet and exercise

## 2022-03-31 DIAGNOSIS — F41.9 ANXIETY: ICD-10-CM

## 2022-03-31 RX ORDER — VORTIOXETINE 10 MG/1
10 TABLET, FILM COATED ORAL DAILY
Qty: 90 TABLET | Refills: 0 | Status: SHIPPED | OUTPATIENT
Start: 2022-03-31 | End: 2022-05-02 | Stop reason: SDUPTHER

## 2022-05-02 DIAGNOSIS — F41.9 ANXIETY: ICD-10-CM

## 2022-05-02 NOTE — TELEPHONE ENCOUNTER
Caller: Paradise Wilson    Relationship: Self    Best call back number: [phone]    Requested Prescriptions:   Requested Prescriptions     Pending Prescriptions Disp Refills   • Vortioxetine HBr (Trintellix) 10 MG tablet 90 tablet 0     Sig: Take 10 mg by mouth Daily.        Pharmacy where request should be sent: [address] [phone] [facility] [phone] FX     Additional details provided by patient:    Does the patient have less than a 3 day supply:  [x] Yes  [] No    Kalen Banda Rep   05/02/22 10:57 EDT

## 2022-05-04 RX ORDER — VORTIOXETINE 10 MG/1
10 TABLET, FILM COATED ORAL DAILY
Qty: 90 TABLET | Refills: 0 | Status: SHIPPED | OUTPATIENT
Start: 2022-05-04 | End: 2022-07-12 | Stop reason: SDUPTHER

## 2022-07-12 DIAGNOSIS — F41.9 ANXIETY: ICD-10-CM

## 2022-07-12 NOTE — TELEPHONE ENCOUNTER
Caller: Paradise Wilson    Relationship: Self    Best call back number: [phone]    Requested Prescriptions:   Requested Prescriptions     Pending Prescriptions Disp Refills   • Vortioxetine HBr (Trintellix) 10 MG tablet 90 tablet 0     Sig: Take 10 mg by mouth Daily.        Pharmacy where request should be sent: [facility] [address] [phone] [facility] [phone] FX     Does the patient have less than a 3 day supply:  [x] Yes  [] No    Kalen GALVIN Rep   07/12/22 14:35 EDT

## 2022-07-13 RX ORDER — VORTIOXETINE 10 MG/1
10 TABLET, FILM COATED ORAL DAILY
Qty: 90 TABLET | Refills: 0 | Status: SHIPPED | OUTPATIENT
Start: 2022-07-13 | End: 2022-08-30 | Stop reason: SDUPTHER

## 2022-08-30 ENCOUNTER — TELEPHONE (OUTPATIENT)
Dept: INTERNAL MEDICINE | Facility: CLINIC | Age: 27
End: 2022-08-30

## 2022-08-30 DIAGNOSIS — F41.9 ANXIETY: ICD-10-CM

## 2022-09-02 NOTE — TELEPHONE ENCOUNTER
Red rule verified and correct.    Requested Prescriptions     Signed Prescriptions Disp Refills   • Vortioxetine HBr (Trintellix) 10 MG tablet tablet 30 tablet 0     Sig: Take 10 mg by mouth Daily.     Authorizing Provider: KAREN VARGAS     Ordering User: MONICA CUNNINGHAM         Pt scheduled for 9/14/22.  Was supposed to be seen in April, but she cancelled appt.    Stressed that she really need to be at this appt.    Instructed to call a week before she runs out of medication.    Patient verbalized understanding.

## 2022-09-14 ENCOUNTER — OFFICE VISIT (OUTPATIENT)
Dept: INTERNAL MEDICINE | Facility: CLINIC | Age: 27
End: 2022-09-14

## 2022-09-14 VITALS
OXYGEN SATURATION: 100 % | RESPIRATION RATE: 18 BRPM | SYSTOLIC BLOOD PRESSURE: 102 MMHG | TEMPERATURE: 98.2 F | WEIGHT: 188 LBS | BODY MASS INDEX: 36.72 KG/M2 | DIASTOLIC BLOOD PRESSURE: 68 MMHG | HEART RATE: 89 BPM

## 2022-09-14 DIAGNOSIS — F41.9 ANXIETY: Primary | ICD-10-CM

## 2022-09-14 PROCEDURE — 99213 OFFICE O/P EST LOW 20 MIN: CPT | Performed by: INTERNAL MEDICINE

## 2022-09-14 RX ORDER — BUPROPION HYDROCHLORIDE 150 MG/1
150 TABLET, EXTENDED RELEASE ORAL 2 TIMES DAILY
Qty: 270 TABLET | Refills: 1 | Status: SHIPPED | OUTPATIENT
Start: 2022-09-14 | End: 2022-12-14 | Stop reason: SDUPTHER

## 2022-09-14 NOTE — PROGRESS NOTES
Chief Complaint  medication management (Could not get medication so she stopped taking it)    Subjective          Paradise WAGNER [facility] presents to [facility]  History of Present Illness     Still having some stress at work  thouth that taking something   manging stress better with the trintellix  Not as bad because she has a different job      Objective   Vital Signs:   /68 (BP Location: Left arm, Patient Position: Sitting, Cuff Size: Adult)   Pulse 89   Temp 98.2 °F (36.8 °C) (Oral)   Resp 18   Wt 85.3 kg (188 lb)   SpO2 100%   BMI 36.72 kg/m²     Physical Exam  Vitals reviewed.   Constitutional:       Appearance: Normal appearance. She is well-developed.   HENT:      Head: Normocephalic and atraumatic.      Right Ear: External ear normal.      Left Ear: External ear normal.   Eyes:      Conjunctiva/sclera: Conjunctivae normal.      Pupils: Pupils are equal, round, and reactive to light.   Cardiovascular:      Rate and Rhythm: Normal rate and regular rhythm.      Heart sounds: No murmur heard.    No friction rub. No gallop.   Pulmonary:      Effort: Pulmonary effort is normal.      Breath sounds: Normal breath sounds. No wheezing or rhonchi.   Skin:     General: Skin is warm and dry.   Neurological:      Mental Status: She is alert and oriented to person, place, and time.   Psychiatric:         Mood and Affect: Affect normal.         Behavior: Behavior normal.         Thought Content: Thought content normal.        Result Review :           Results for orders placed or performed during the hospital encounter of 04/25/22   POCT Influenza A/B    Specimen: Swab   Result Value Ref Range    Rapid Influenza A Ag Negative Negative    Rapid Influenza B Ag Negative Negative    Internal Control Passed Passed    Lot Number [id number]     Expiration Date 5,302,023    POCT SARS-CoV-2 Antigen ROSA ISELA   ([facility])    Specimen: Swab   Result Value Ref Range     SARS Antigen Not Detected Not Detected, Presumptive Negative    Internal Control Passed Passed    Lot Number [id number]     Expiration Date 7,302,389             Procedures        Assessment and Plan    Diagnoses and all orders for this visit:    1. Anxiety (Primary)  Comments:  will try wellbutrin; warned of side effects    Other orders  -     buPROPion SR (Wellbutrin SR) 150 MG 12 hr tablet; Take 1 tablet by mouth 2 (Two) Times a Day.  Dispense: 270 tablet; Refill: 1                  Follow Up   Return in about 3 months (around 12/14/2022).  Patient was given instructions and counseling regarding her condition or for health maintenance advice. Please see specific information pulled into the AVS if appropriate.

## 2022-12-14 ENCOUNTER — OFFICE VISIT (OUTPATIENT)
Dept: INTERNAL MEDICINE | Facility: CLINIC | Age: 27
End: 2022-12-14

## 2022-12-14 VITALS
TEMPERATURE: 97.5 F | SYSTOLIC BLOOD PRESSURE: 120 MMHG | BODY MASS INDEX: 37.58 KG/M2 | HEIGHT: 60 IN | DIASTOLIC BLOOD PRESSURE: 70 MMHG | HEART RATE: 97 BPM | WEIGHT: 191.4 LBS | OXYGEN SATURATION: 100 %

## 2022-12-14 DIAGNOSIS — Z13.220 SCREENING, LIPID: ICD-10-CM

## 2022-12-14 DIAGNOSIS — E66.9 OBESITY (BMI 30.0-34.9): ICD-10-CM

## 2022-12-14 DIAGNOSIS — F41.9 ANXIETY: Primary | ICD-10-CM

## 2022-12-14 DIAGNOSIS — Z23 FLU VACCINE NEED: ICD-10-CM

## 2022-12-14 LAB
ALBUMIN SERPL-MCNC: 4.4 G/DL (ref 3.5–5.2)
ALBUMIN/GLOB SERPL: 1.8 G/DL
ALP SERPL-CCNC: 95 U/L (ref 39–117)
ALT SERPL W P-5'-P-CCNC: 19 U/L (ref 1–33)
ANION GAP SERPL CALCULATED.3IONS-SCNC: 12.1 MMOL/L (ref 5–15)
AST SERPL-CCNC: 26 U/L (ref 1–32)
BASOPHILS # BLD AUTO: 0.05 10*3/MM3 (ref 0–0.2)
BASOPHILS NFR BLD AUTO: 0.5 % (ref 0–1.5)
BILIRUB SERPL-MCNC: 0.2 MG/DL (ref 0–1.2)
BUN SERPL-MCNC: 10 MG/DL (ref 6–20)
BUN/CREAT SERPL: 13.3 (ref 7–25)
CALCIUM SPEC-SCNC: 9.6 MG/DL (ref 8.6–10.5)
CHLORIDE SERPL-SCNC: 102 MMOL/L (ref 98–107)
CHOLEST SERPL-MCNC: 164 MG/DL (ref 0–200)
CO2 SERPL-SCNC: 25.9 MMOL/L (ref 22–29)
CREAT SERPL-MCNC: 0.75 MG/DL (ref 0.57–1)
DEPRECATED RDW RBC AUTO: 40.1 FL (ref 37–54)
EGFRCR SERPLBLD CKD-EPI 2021: 112.1 ML/MIN/1.73
EOSINOPHIL # BLD AUTO: 0.37 10*3/MM3 (ref 0–0.4)
EOSINOPHIL NFR BLD AUTO: 3.8 % (ref 0.3–6.2)
ERYTHROCYTE [DISTWIDTH] IN BLOOD BY AUTOMATED COUNT: 13.2 % (ref 12.3–15.4)
GLOBULIN UR ELPH-MCNC: 2.4 GM/DL
GLUCOSE SERPL-MCNC: 94 MG/DL (ref 65–99)
HCT VFR BLD AUTO: 35.7 % (ref 34–46.6)
HDLC SERPL-MCNC: 35 MG/DL (ref 40–60)
HGB BLD-MCNC: 12.4 G/DL (ref 12–15.9)
IMM GRANULOCYTES # BLD AUTO: 0.03 10*3/MM3 (ref 0–0.05)
IMM GRANULOCYTES NFR BLD AUTO: 0.3 % (ref 0–0.5)
LDLC SERPL CALC-MCNC: 84 MG/DL (ref 0–100)
LDLC/HDLC SERPL: 2.15 {RATIO}
LYMPHOCYTES # BLD AUTO: 2.96 10*3/MM3 (ref 0.7–3.1)
LYMPHOCYTES NFR BLD AUTO: 30.1 % (ref 19.6–45.3)
MCH RBC QN AUTO: 28.6 PG (ref 26.6–33)
MCHC RBC AUTO-ENTMCNC: 34.7 G/DL (ref 31.5–35.7)
MCV RBC AUTO: 82.4 FL (ref 79–97)
MONOCYTES # BLD AUTO: 0.8 10*3/MM3 (ref 0.1–0.9)
MONOCYTES NFR BLD AUTO: 8.1 % (ref 5–12)
NEUTROPHILS NFR BLD AUTO: 5.62 10*3/MM3 (ref 1.7–7)
NEUTROPHILS NFR BLD AUTO: 57.2 % (ref 42.7–76)
NRBC BLD AUTO-RTO: 0 /100 WBC (ref 0–0.2)
PLATELET # BLD AUTO: 245 10*3/MM3 (ref 140–450)
PMV BLD AUTO: 10.3 FL (ref 6–12)
POTASSIUM SERPL-SCNC: 4 MMOL/L (ref 3.5–5.2)
PROT SERPL-MCNC: 6.8 G/DL (ref 6–8.5)
RBC # BLD AUTO: 4.33 10*6/MM3 (ref 3.77–5.28)
SODIUM SERPL-SCNC: 140 MMOL/L (ref 136–145)
TRIGL SERPL-MCNC: 269 MG/DL (ref 0–150)
TSH SERPL DL<=0.05 MIU/L-ACNC: 1.8 UIU/ML (ref 0.27–4.2)
VLDLC SERPL-MCNC: 45 MG/DL (ref 5–40)
WBC NRBC COR # BLD: 9.83 10*3/MM3 (ref 3.4–10.8)

## 2022-12-14 PROCEDURE — 90686 IIV4 VACC NO PRSV 0.5 ML IM: CPT | Performed by: INTERNAL MEDICINE

## 2022-12-14 PROCEDURE — 80061 LIPID PANEL: CPT | Performed by: INTERNAL MEDICINE

## 2022-12-14 PROCEDURE — 99214 OFFICE O/P EST MOD 30 MIN: CPT | Performed by: INTERNAL MEDICINE

## 2022-12-14 PROCEDURE — 82306 VITAMIN D 25 HYDROXY: CPT | Performed by: INTERNAL MEDICINE

## 2022-12-14 PROCEDURE — 80050 GENERAL HEALTH PANEL: CPT | Performed by: INTERNAL MEDICINE

## 2022-12-14 PROCEDURE — 90471 IMMUNIZATION ADMIN: CPT | Performed by: INTERNAL MEDICINE

## 2022-12-14 RX ORDER — BUPROPION HYDROCHLORIDE 150 MG/1
150 TABLET, EXTENDED RELEASE ORAL 2 TIMES DAILY
Qty: 270 TABLET | Refills: 1 | Status: SHIPPED | OUTPATIENT
Start: 2022-12-14 | End: 2023-02-14 | Stop reason: SDUPTHER

## 2022-12-15 LAB — 25(OH)D3 SERPL-MCNC: 30.5 NG/ML (ref 30–100)

## 2022-12-20 NOTE — PROGRESS NOTES
"Chief Complaint  Follow-up (6 weeks ago ) and Anxiety    Subjective          [facility]  History of Present Illness     Was not able to get weight loss medication  Feels like the Wellbutrin helped more than the Trintellix  Still very frustrated with her weight would like to stay on the Wellbutrin discussed different ways that we could do this      Objective   Vital Signs:   /70 (BP Location: Right arm, Patient Position: Sitting, Cuff Size: Adult)   Pulse 97   Temp 97.5 °F (36.4 °C)   Ht 152.4 cm (60\")   Wt 86.8 kg (191 lb 6.4 oz)   SpO2 100%   BMI 37.38 kg/m²     Physical Exam  Vitals reviewed.   Constitutional:       Appearance: Normal appearance. She is well-developed.   HENT:      Head: Normocephalic and atraumatic.      Right Ear: External ear normal.      Left Ear: External ear normal.   Eyes:      Conjunctiva/sclera: Conjunctivae normal.      Pupils: Pupils are equal, round, and reactive to light.   Cardiovascular:      Rate and Rhythm: Normal rate and regular rhythm.      Heart sounds: No murmur heard.    No friction rub. No gallop.   Pulmonary:      Effort: Pulmonary effort is normal.      Breath sounds: Normal breath sounds. No wheezing or rhonchi.   Skin:     General: Skin is warm and dry.   Neurological:      Mental Status: She is alert and oriented to person, place, and time.   Psychiatric:         Mood and Affect: Affect normal.         Behavior: Behavior normal.         Thought Content: Thought content normal.        Result Review :       Common labs    Common Labs 12/14/22 12/14/22 12/14/22    1812 1812 1812   Glucose 94     BUN 10     Creatinine 0.75     Sodium 140     Potassium 4.0     Chloride 102     Calcium 9.6     Albumin 4.40     Total Bilirubin 0.2     Alkaline Phosphatase 95     AST (SGOT) 26     ALT (SGPT) 19     WBC   9.83   Hemoglobin   12.4   Hematocrit   35.7   Platelets   245   Total Cholesterol  " 164    Triglycerides  269 (A)    HDL Cholesterol  35 (A)    LDL Cholesterol   84    (A) Abnormal value              Results for orders placed or performed in visit on 12/14/22   Comprehensive Metabolic Panel    Specimen: Blood   Result Value Ref Range    Glucose 94 65 - 99 mg/dL    BUN 10 6 - 20 mg/dL    Creatinine 0.75 0.57 - 1.00 mg/dL    Sodium 140 136 - 145 mmol/L    Potassium 4.0 3.5 - 5.2 mmol/L    Chloride 102 98 - 107 mmol/L    CO2 25.9 22.0 - 29.0 mmol/L    Calcium 9.6 8.6 - 10.5 mg/dL    Total Protein 6.8 6.0 - 8.5 g/dL    Albumin 4.40 3.50 - 5.20 g/dL    ALT (SGPT) 19 1 - 33 U/L    AST (SGOT) 26 1 - 32 U/L    Alkaline Phosphatase 95 39 - 117 U/L    Total Bilirubin 0.2 0.0 - 1.2 mg/dL    Globulin 2.4 gm/dL    A/G Ratio 1.8 g/dL    BUN/Creatinine Ratio 13.3 7.0 - 25.0    Anion Gap 12.1 5.0 - 15.0 mmol/L    eGFR 112.1 >60.0 mL/min/1.73   TSH    Specimen: Blood   Result Value Ref Range    TSH 1.800 0.270 - 4.200 uIU/mL   Lipid Panel    Specimen: Blood   Result Value Ref Range    Total Cholesterol 164 0 - 200 mg/dL    Triglycerides 269 (H) 0 - 150 mg/dL    HDL Cholesterol 35 (L) 40 - 60 mg/dL    LDL Cholesterol  84 0 - 100 mg/dL    VLDL Cholesterol 45 (H) 5 - 40 mg/dL    LDL/HDL Ratio 2.15    Vitamin D,25-Hydroxy    Specimen: Blood   Result Value Ref Range    25 Hydroxy, Vitamin D 30.5 30.0 - 100.0 ng/ml   CBC Auto Differential    Specimen: Blood   Result Value Ref Range    WBC 9.83 3.40 - 10.80 10*3/mm3    RBC 4.33 3.77 - 5.28 10*6/mm3    Hemoglobin 12.4 12.0 - 15.9 g/dL    Hematocrit 35.7 34.0 - 46.6 %    MCV 82.4 79.0 - 97.0 fL    MCH 28.6 26.6 - 33.0 pg    MCHC 34.7 31.5 - 35.7 g/dL    RDW 13.2 12.3 - 15.4 %    RDW-SD 40.1 37.0 - 54.0 fl    MPV 10.3 6.0 - 12.0 fL    Platelets 245 140 - 450 10*3/mm3    Neutrophil % 57.2 42.7 - 76.0 %    Lymphocyte % 30.1 19.6 - 45.3 %    Monocyte % 8.1 5.0 - 12.0 %    Eosinophil % 3.8 0.3 - 6.2 %    Basophil % 0.5 0.0 - 1.5 %    Immature Grans % 0.3 0.0 - 0.5 %     Neutrophils, Absolute 5.62 1.70 - 7.00 10*3/mm3    Lymphocytes, Absolute 2.96 0.70 - 3.10 10*3/mm3    Monocytes, Absolute 0.80 0.10 - 0.90 10*3/mm3    Eosinophils, Absolute 0.37 0.00 - 0.40 10*3/mm3    Basophils, Absolute 0.05 0.00 - 0.20 10*3/mm3    Immature Grans, Absolute 0.03 0.00 - 0.05 10*3/mm3    nRBC 0.0 0.0 - 0.2 /100 WBC            Procedures        Assessment and Plan    Diagnoses and all orders for this visit:    1. Anxiety (Primary)  Comments:  Adjust Wellbutrin  Orders:  -     Comprehensive Metabolic Panel  -     CBC & Differential  -     TSH  -     Lipid Panel  -     Vitamin D,25-Hydroxy    2. Flu vaccine need  -     FluLaval/Fluarix/Fluzone >6 Months    3. Obesity (BMI 30.0-34.9)  Comments:  Continue to work on diet and being active  Orders:  -     Comprehensive Metabolic Panel  -     CBC & Differential  -     TSH  -     Lipid Panel  -     Vitamin D,25-Hydroxy    4. Screening, lipid  Comments:  Labs today  Orders:  -     Lipid Panel    Other orders  -     buPROPion SR (Wellbutrin SR) 150 MG 12 hr tablet; Take 1 tablet by mouth 2 (Two) Times a Day.  Dispense: 270 tablet; Refill: 1                  Follow Up   No follow-ups on file.  Patient was given instructions and counseling regarding her condition or for health maintenance advice. Please see specific information pulled into the AVS if appropriate.

## 2023-02-10 ENCOUNTER — TELEPHONE (OUTPATIENT)
Dept: INTERNAL MEDICINE | Facility: CLINIC | Age: 28
End: 2023-02-10

## 2023-02-10 DIAGNOSIS — N92.6 ABNORMAL MENSTRUAL PERIODS: ICD-10-CM

## 2023-02-10 DIAGNOSIS — N64.4 BREAST TENDERNESS: Primary | ICD-10-CM

## 2023-02-10 NOTE — TELEPHONE ENCOUNTER
Hub staff attempted to follow warm transfer process and was unsuccessful     Caller: Paradise Wilson A    Relationship to patient: Self    Best call back number: [phone]    Patient is needing:     THE PATIENT IS WANTING TO KNOW IF PCP ALICIA WILL DO  BLOOD WORK TO TEST FOR PREGNANCY . SHE SAID SHE HAD HER TUBES TIED FOUR YEARS AGO BUT HAS BEEN HAVING  PREGNANCY SYMPTOMS THAT SHE HAS HAD BEFORE. SHE SAID SHE IS EXPERIENCING HEART BURN, CRAMPING , BREAST HAS BEEN HEAVY AND CAN SEE VEINS IN HER BREAST. SHE ALSO SAID THAT SHE STARTED HER PERIOD EARLY. SHE SAID SHE IS NOT SURE IF SHE IS PREGNANT BUT SOMETHING IS GOING ON.     SHE IS REQUESTING AN ORDER FOR BLOOD WORK IF THIS CAN BE DONE     SHE IS WANTING A CALL TO ADVISE IF THIS CAN BE DONE

## 2023-02-13 ENCOUNTER — HOSPITAL ENCOUNTER (EMERGENCY)
Facility: HOSPITAL | Age: 28
Discharge: HOME | End: 2023-02-13
Attending: EMERGENCY MEDICINE | Admitting: EMERGENCY MEDICINE
Payer: COMMERCIAL

## 2023-02-13 ENCOUNTER — APPOINTMENT (OUTPATIENT)
Dept: ULTRASOUND IMAGING | Facility: HOSPITAL | Age: 28
End: 2023-02-13
Payer: COMMERCIAL

## 2023-02-13 VITALS
HEIGHT: 59 IN | WEIGHT: 186.73 LBS | TEMPERATURE: 98 F | DIASTOLIC BLOOD PRESSURE: 83 MMHG | BODY MASS INDEX: 37.64 KG/M2 | RESPIRATION RATE: 16 BRPM | HEART RATE: 108 BPM | OXYGEN SATURATION: 99 % | SYSTOLIC BLOOD PRESSURE: 127 MMHG

## 2023-02-13 DIAGNOSIS — R10.30 LOWER ABDOMINAL PAIN: Primary | ICD-10-CM

## 2023-02-13 DIAGNOSIS — N64.4 BREAST TENDERNESS: ICD-10-CM

## 2023-02-13 DIAGNOSIS — N92.6 ABNORMAL MENSTRUAL PERIODS: ICD-10-CM

## 2023-02-13 LAB
ALBUMIN SERPL-MCNC: 4.3 G/DL (ref 3.5–5.2)
ALBUMIN/GLOB SERPL: 1.6 G/DL
ALP SERPL-CCNC: 94 U/L (ref 39–117)
ALT SERPL W P-5'-P-CCNC: 16 U/L (ref 1–33)
ANION GAP SERPL CALCULATED.3IONS-SCNC: 9 MMOL/L (ref 5–15)
AST SERPL-CCNC: 20 U/L (ref 1–32)
BACTERIA UR QL AUTO: ABNORMAL /HPF
BASOPHILS # BLD AUTO: 0.08 10*3/MM3 (ref 0–0.2)
BASOPHILS NFR BLD AUTO: 0.9 % (ref 0–1.5)
BILIRUB SERPL-MCNC: 0.3 MG/DL (ref 0–1.2)
BILIRUB UR QL STRIP: NEGATIVE
BUN SERPL-MCNC: 17 MG/DL (ref 6–20)
BUN/CREAT SERPL: 20.7 (ref 7–25)
CALCIUM SPEC-SCNC: 9.1 MG/DL (ref 8.6–10.5)
CHLORIDE SERPL-SCNC: 100 MMOL/L (ref 98–107)
CLARITY UR: CLEAR
CO2 SERPL-SCNC: 26 MMOL/L (ref 22–29)
COLOR UR: YELLOW
CREAT SERPL-MCNC: 0.82 MG/DL (ref 0.57–1)
DEPRECATED RDW RBC AUTO: 39 FL (ref 37–54)
EGFRCR SERPLBLD CKD-EPI 2021: 100.7 ML/MIN/1.73
EOSINOPHIL # BLD AUTO: 0.22 10*3/MM3 (ref 0–0.4)
EOSINOPHIL NFR BLD AUTO: 2.5 % (ref 0.3–6.2)
ERYTHROCYTE [DISTWIDTH] IN BLOOD BY AUTOMATED COUNT: 12.9 % (ref 12.3–15.4)
GLOBULIN UR ELPH-MCNC: 2.7 GM/DL
GLUCOSE SERPL-MCNC: 105 MG/DL (ref 65–99)
GLUCOSE UR STRIP-MCNC: NEGATIVE MG/DL
HCG INTACT+B SERPL-ACNC: <0.5 MIU/ML
HCT VFR BLD AUTO: 38 % (ref 34–46.6)
HGB BLD-MCNC: 13.1 G/DL (ref 12–15.9)
HGB UR QL STRIP.AUTO: ABNORMAL
HOLD SPECIMEN: NORMAL
HOLD SPECIMEN: NORMAL
HYALINE CASTS UR QL AUTO: ABNORMAL /LPF
IMM GRANULOCYTES # BLD AUTO: 0.02 10*3/MM3 (ref 0–0.05)
IMM GRANULOCYTES NFR BLD AUTO: 0.2 % (ref 0–0.5)
KETONES UR QL STRIP: NEGATIVE
LEUKOCYTE ESTERASE UR QL STRIP.AUTO: NEGATIVE
LIPASE SERPL-CCNC: 14 U/L (ref 13–60)
LYMPHOCYTES # BLD AUTO: 4.34 10*3/MM3 (ref 0.7–3.1)
LYMPHOCYTES NFR BLD AUTO: 49.8 % (ref 19.6–45.3)
MCH RBC QN AUTO: 28.9 PG (ref 26.6–33)
MCHC RBC AUTO-ENTMCNC: 34.5 G/DL (ref 31.5–35.7)
MCV RBC AUTO: 83.7 FL (ref 79–97)
MONOCYTES # BLD AUTO: 0.73 10*3/MM3 (ref 0.1–0.9)
MONOCYTES NFR BLD AUTO: 8.4 % (ref 5–12)
NEUTROPHILS NFR BLD AUTO: 3.33 10*3/MM3 (ref 1.7–7)
NEUTROPHILS NFR BLD AUTO: 38.2 % (ref 42.7–76)
NITRITE UR QL STRIP: NEGATIVE
NRBC BLD AUTO-RTO: 0 /100 WBC (ref 0–0.2)
PH UR STRIP.AUTO: 6.5 [PH] (ref 5–8)
PLATELET # BLD AUTO: 229 10*3/MM3 (ref 140–450)
PMV BLD AUTO: 9.6 FL (ref 6–12)
POTASSIUM SERPL-SCNC: 3.9 MMOL/L (ref 3.5–5.2)
PROT SERPL-MCNC: 7 G/DL (ref 6–8.5)
PROT UR QL STRIP: NEGATIVE
RBC # BLD AUTO: 4.54 10*6/MM3 (ref 3.77–5.28)
RBC # UR STRIP: ABNORMAL /HPF
REF LAB TEST METHOD: ABNORMAL
SODIUM SERPL-SCNC: 135 MMOL/L (ref 136–145)
SP GR UR STRIP: 1.01 (ref 1–1.03)
SQUAMOUS #/AREA URNS HPF: ABNORMAL /HPF
TSH SERPL DL<=0.05 MIU/L-ACNC: 3.72 UIU/ML (ref 0.27–4.2)
UROBILINOGEN UR QL STRIP: ABNORMAL
WBC # UR STRIP: ABNORMAL /HPF
WBC NRBC COR # BLD: 8.72 10*3/MM3 (ref 3.4–10.8)
WHOLE BLOOD HOLD COAG: NORMAL
WHOLE BLOOD HOLD SPECIMEN: NORMAL

## 2023-02-13 PROCEDURE — 76830 TRANSVAGINAL US NON-OB: CPT

## 2023-02-13 PROCEDURE — 81001 URINALYSIS AUTO W/SCOPE: CPT | Performed by: NURSE PRACTITIONER

## 2023-02-13 PROCEDURE — 84702 CHORIONIC GONADOTROPIN TEST: CPT | Performed by: NURSE PRACTITIONER

## 2023-02-13 PROCEDURE — 99283 EMERGENCY DEPT VISIT LOW MDM: CPT

## 2023-02-13 PROCEDURE — 80050 GENERAL HEALTH PANEL: CPT | Performed by: NURSE PRACTITIONER

## 2023-02-13 PROCEDURE — 83690 ASSAY OF LIPASE: CPT | Performed by: NURSE PRACTITIONER

## 2023-02-13 RX ORDER — DICYCLOMINE HYDROCHLORIDE 10 MG/1
20 CAPSULE ORAL ONCE
Status: COMPLETED | OUTPATIENT
Start: 2023-02-13 | End: 2023-02-13

## 2023-02-13 RX ORDER — SODIUM CHLORIDE 0.9 % (FLUSH) 0.9 %
10 SYRINGE (ML) INJECTION AS NEEDED
Status: DISCONTINUED | OUTPATIENT
Start: 2023-02-13 | End: 2023-02-13 | Stop reason: HOSPADM

## 2023-02-13 RX ORDER — IBUPROFEN 400 MG/1
800 TABLET ORAL ONCE
Status: COMPLETED | OUTPATIENT
Start: 2023-02-13 | End: 2023-02-13

## 2023-02-13 RX ORDER — ONDANSETRON 4 MG/1
4 TABLET, ORALLY DISINTEGRATING ORAL EVERY 8 HOURS PRN
Qty: 10 TABLET | Refills: 0 | Status: SHIPPED | OUTPATIENT
Start: 2023-02-13

## 2023-02-13 RX ORDER — DICYCLOMINE HCL 20 MG
20 TABLET ORAL EVERY 6 HOURS
Qty: 20 TABLET | Refills: 0 | Status: SHIPPED | OUTPATIENT
Start: 2023-02-13

## 2023-02-13 RX ADMIN — DICYCLOMINE HYDROCHLORIDE 20 MG: 10 CAPSULE ORAL at 05:04

## 2023-02-13 RX ADMIN — IBUPROFEN 800 MG: 400 TABLET ORAL at 06:27

## 2023-02-13 NOTE — DISCHARGE INSTRUCTIONS
Testing here today was all negative.  No apparent cause for your cramping is readily apparent.    Take medications as prescribed for symptomatic treatment.    Follow-up with your OB/GYN of choice to discuss.    Return for new or worsening symptoms

## 2023-02-13 NOTE — ED PROVIDER NOTES
Time: 4:41 AM EST  Date of encounter:  2/13/2023  Independent Historian/Clinical History and Information was obtained by:   Patient  Chief Complaint: Abdominal pain    History is limited by: N/A    History of Present Illness:  Patient is a 27 y.o. year old female who presents to the emergency department for evaluation of low abdominal cramping.  Patient states she has had similar symptoms ever since she got her tubes tied years ago.  Patient concerned about possible pregnancy      History provided by:  Patient  Abdominal Pain  Pain location:  LLQ, RLQ and suprapubic  Pain quality: cramping    Pain radiates to:  Does not radiate  Pain severity:  Moderate  Onset quality:  Gradual  Duration:  3 weeks  Timing:  Intermittent  Progression:  Worsening  Chronicity:  New  Context: not retching, not sick contacts, not suspicious food intake and not trauma    Context comment:  Intermittent lower abdominal cramping over the past 3 weeks.  This morning she was on her way to work and the cramping was so severe it made her double over and sick to her stomach  Relieved by:  Nothing  Worsened by:  Nothing  Ineffective treatments:  None tried  Associated symptoms: nausea    Associated symptoms: no anorexia, no belching, no chest pain, no chills, no constipation, no cough, no diarrhea, no dysuria, no fatigue, no fever, no flatus, no hematemesis, no hematochezia, no hematuria, no melena, no shortness of breath, no sore throat, no vaginal bleeding, no vaginal discharge and no vomiting        Patient Care Team  Primary Care Provider: Tere Em MD    Past Medical History:     No Known Allergies  Past Medical History:   Diagnosis Date   • Anxiety    • Dysmenorrhea      Past Surgical History:   Procedure Laterality Date   • TUBAL ABDOMINAL LIGATION  2018   • WISDOM TOOTH EXTRACTION       Family History   Problem Relation Age of Onset   • Heart disease Maternal Grandmother    • Diabetes type II Maternal Grandmother         mellitus  "  • Stroke Other        Home Medications:  Prior to Admission medications    Medication Sig Start Date End Date Taking? Authorizing Provider   buPROPion SR (Wellbutrin SR) 150 MG 12 hr tablet Take 1 tablet by mouth 2 (Two) Times a Day. 12/14/22   Tere Em MD   fluticasone (FLONASE) 50 MCG/ACT nasal spray 2 sprays into the nostril(s) as directed by provider Daily. 4/25/22   Pete Ballard PA        Social History:   Social History     Tobacco Use   • Smoking status: Never   • Smokeless tobacco: Never   Vaping Use   • Vaping Use: Never used   Substance Use Topics   • Alcohol use: Never   • Drug use: Never         Review of Systems:  Review of Systems   Constitutional: Negative for chills, fatigue and fever.   HENT: Negative for congestion, ear pain and sore throat.    Eyes: Negative for pain.   Respiratory: Negative for cough, chest tightness and shortness of breath.    Cardiovascular: Negative for chest pain.   Gastrointestinal: Positive for abdominal pain and nausea. Negative for anorexia, constipation, diarrhea, flatus, hematemesis, hematochezia, melena and vomiting.   Genitourinary: Negative for dysuria, flank pain, hematuria, vaginal bleeding and vaginal discharge.   Musculoskeletal: Negative for joint swelling. Back pain:  Over the past 3 weeks she has had some low back achiness but none currently.   Skin: Negative for pallor.   Neurological: Negative for seizures and headaches.   Hematological: Negative.    Psychiatric/Behavioral: Negative.    All other systems reviewed and are negative.       Physical Exam:  /83 (Patient Position: Sitting)   Pulse 108   Temp 98 °F (36.7 °C) (Oral)   Resp 16   Ht 149.9 cm (59\")   Wt 84.7 kg (186 lb 11.7 oz)   SpO2 99%   BMI 37.71 kg/m²     Physical Exam  Vitals and nursing note reviewed.   Constitutional:       General: She is not in acute distress.     Appearance: Normal appearance. She is not toxic-appearing.   HENT:      Head: Normocephalic and " atraumatic.      Mouth/Throat:      Mouth: Mucous membranes are moist.   Eyes:      General: No scleral icterus.  Cardiovascular:      Rate and Rhythm: Regular rhythm. Tachycardia present.      Pulses: Normal pulses.      Heart sounds: Normal heart sounds.   Pulmonary:      Effort: Pulmonary effort is normal. No respiratory distress.      Breath sounds: Normal breath sounds.   Abdominal:      General: Abdomen is flat. Bowel sounds are normal.      Palpations: Abdomen is soft.      Tenderness: There is abdominal tenderness ( Low pelvis) in the right lower quadrant, suprapubic area and left lower quadrant. There is no right CVA tenderness or left CVA tenderness.   Musculoskeletal:         General: Normal range of motion.      Cervical back: Normal range of motion and neck supple.   Skin:     General: Skin is warm and dry.   Neurological:      Mental Status: She is alert and oriented to person, place, and time. Mental status is at baseline.   Psychiatric:         Mood and Affect: Mood normal.         Behavior: Behavior normal.          Procedures:  Procedures      Medical Decision Making:      Comorbidities that affect care:    None    External Notes reviewed:    None      The following orders were placed and all results were independently analyzed by me:  Orders Placed This Encounter   Procedures   • US Non-ob Transvaginal   • [place] Draw   • Comprehensive Metabolic Panel   • Lipase   • Urinalysis With Microscopic If Indicated (No Culture) - Urine, Clean Catch   • hCG, Quantitative, Pregnancy   • CBC Auto Differential   • Urinalysis, Microscopic Only - Urine, Clean Catch   • Insert Peripheral IV   • CBC & Differential   • Green Top (Gel)   • Lavender Top   • Gold Top - SST   • Light Blue Top       Medications Given in the Emergency Department:  Medications   sodium chloride 0.9 % flush 10 mL (has no administration in time range)   dicyclomine (BENTYL) capsule 20 mg (20 mg Oral Given 2/13/23 1469)   ibuprofen  (ADVIL,MOTRIN) tablet 800 mg (800 mg Oral Given 2/13/23 0627)        ED Course:    ED Course as of 02/13/23 0647 Mon Feb 13, 2023   0644  Non-ob Transvaginal  No acute findings [DS]      ED Course User Index  [DS] Crystal Ramirez APRN       Labs:    Lab Results (last 24 hours)     Procedure Component Value Units Date/Time    CBC & Differential [[id number]]  (Abnormal) Collected: 02/13/23 0445    Specimen: Blood Updated: 02/13/23 0458    Narrative:      The following orders were created for panel order CBC & Differential.  Procedure                               Abnormality         Status                     ---------                               -----------         ------                     CBC Auto Differential[[id number]]        Abnormal            Final result                 Please view results for these tests on the individual orders.    Comprehensive Metabolic Panel [[id number]]  (Abnormal) Collected: 02/13/23 0445    Specimen: Blood Updated: 02/13/23 0525     Glucose 105 mg/dL      BUN 17 mg/dL      Creatinine 0.82 mg/dL      Sodium 135 mmol/L      Potassium 3.9 mmol/L      Chloride 100 mmol/L      CO2 26.0 mmol/L      Calcium 9.1 mg/dL      Total Protein 7.0 g/dL      Albumin 4.3 g/dL      ALT (SGPT) 16 U/L      AST (SGOT) 20 U/L      Alkaline Phosphatase 94 U/L      Total Bilirubin 0.3 mg/dL      Globulin 2.7 gm/dL      A/G Ratio 1.6 g/dL      BUN/Creatinine Ratio 20.7     Anion Gap 9.0 mmol/L      eGFR 100.7 mL/min/1.73     Narrative:      GFR Normal >60  Chronic Kidney Disease <60  Kidney Failure <15      Lipase [[id number]]  (Normal) Collected: 02/13/23 0445    Specimen: Blood Updated: 02/13/23 0525     Lipase 14 U/L     hCG, Quantitative, Pregnancy [[id number]] Collected: 02/13/23 0445    Specimen: Blood Updated: 02/13/23 0521     HCG Quantitative <0.50 mIU/mL     Narrative:      HCG Ranges by Gestational Age    Females - non-pregnant premenopausal   </= 1mIU/mL HCG  Females - postmenopausal                </= 7mIU/mL HCG    3 Weeks       5.4   -      72 mIU/mL  4 Weeks      10.2   -     708 mIU/mL  5 Weeks       217   -   8,245 mIU/mL  6 Weeks       152   -  32,177 mIU/mL  7 Weeks     4,059   - 153,767 mIU/mL  8 Weeks    31,366   - 149,094 mIU/mL  9 Weeks    59,109   - 135,901 mIU/mL  10 Weeks   44,186   - 170,409 mIU/mL  12 Weeks   27,107   - 201,615 mIU/mL  14 Weeks   24,302   -  93,646 mIU/mL  15 Weeks   12,540   -  69,747 mIU/mL  16 Weeks    8,904   -  55,332 mIU/mL  17 Weeks    8,240   -  51,793 mIU/mL  18 Weeks    9,649   -  55,271 mIU/mL    Results may be falsely decreased if patient taking Biotin.      CBC Auto Differential [[id number]]  (Abnormal) Collected: 02/13/23 0445    Specimen: Blood Updated: 02/13/23 0458     WBC 8.72 10*3/mm3      RBC 4.54 10*6/mm3      Hemoglobin 13.1 g/dL      Hematocrit 38.0 %      MCV 83.7 fL      MCH 28.9 pg      MCHC 34.5 g/dL      RDW 12.9 %      RDW-SD 39.0 fl      MPV 9.6 fL      Platelets 229 10*3/mm3      Neutrophil % 38.2 %      Lymphocyte % 49.8 %      Monocyte % 8.4 %      Eosinophil % 2.5 %      Basophil % 0.9 %      Immature Grans % 0.2 %      Neutrophils, Absolute 3.33 10*3/mm3      Lymphocytes, Absolute 4.34 10*3/mm3      Monocytes, Absolute 0.73 10*3/mm3      Eosinophils, Absolute 0.22 10*3/mm3      Basophils, Absolute 0.08 10*3/mm3      Immature Grans, Absolute 0.02 10*3/mm3      nRBC 0.0 /100 WBC     Urinalysis With Microscopic If Indicated (No Culture) - Urine, Clean Catch [[id number]]  (Abnormal) Collected: 02/13/23 0458    Specimen: Urine, Clean Catch Updated: 02/13/23 0515     Color, UA Yellow     Appearance, UA Clear     pH, UA 6.5     Specific Gravity, UA 1.010     Glucose, UA Negative     Ketones, UA Negative     Bilirubin, UA Negative     Blood, UA Trace     Protein, UA Negative     Leuk Esterase, UA Negative     Nitrite, UA Negative     Urobilinogen, UA 0.2 E.U./dL    Urinalysis, Microscopic Only - Urine, Clean Catch [[id number]]  (Abnormal) Collected:  02/13/23 0458    Specimen: Urine, Clean Catch Updated: 02/13/23 0515     RBC, UA 3-5 /HPF      WBC, UA 0-2 /HPF      Bacteria, UA None Seen /HPF      Squamous Epithelial Cells, UA 3-6 /HPF      Hyaline Casts, UA 0-2 /LPF      Methodology Automated Microscopy           Imaging:    US Non-ob Transvaginal    Result Date: 2/13/2023  PROCEDURE: US NON-OB TRANSVAGINAL  COMPARISON: None  INDICATIONS: Unspecified pelvic pain.  TECHNIQUE: Ultrasound examination of the pelvis was performed, using endovaginal/transvaginal technique.   FINDINGS: Two-dimensional grayscale images as well as color and spectral Doppler analysis are provided for review.  Again, transvaginal/endovaginal technique was utilized. The endocervical canal is nondilated and measures 3.3 cm in length.  There may be small nabothian cysts involving the endocervix.  Minimal, if any, free fluid is identified in the cul-de-sac. The endometrial thickness is at least 0.79 cm.  There may be minimal nonspecific free fluid in the lower uterine segment endometrial cavity, measuring about 9 mm in greatest diameter.  Normal follicles involve the bilateral ovaries.  A dominant follicle versus a functional ovarian cyst is seen on the right, measuring about 2 cm in greatest diameter.  No suspicious uterine or adnexal mass is seen. No ovarian torsion is identified.  That is, normal blood flow involves the bilateral ovaries by duplex ultrasound examination.  The uterus measures 7.5 x 3.7 x 4.8 cm.  The uterine volume is 69.6 mL. The right ovary measures 4.6 x 2.3 x 2.6 cm.  The right ovarian volume is approximately 14.8 mL.  The left ovary measures 3.2 x 1.7 x 1.8 cm.  The left ovarian volume is approximately 5 mL.        1.  1. No suspicious uterine or adnexal masses are seen.  2. No ovarian torsion is identified, bilaterally.  3. Minimal (if any) free fluid is seen in the cul-de-sac.  4. The other findings are as detailed above.  Please note that portions of this note were  completed with a voice recognition program.  SCOTT COLLISN JR, MD       Electronically Signed and Approved By: SCOTT COLLINS JR, MD on 2/13/2023 at 6:34                 Differential Diagnosis and Discussion:    Abdominal Pain: Based on the patient's signs and symptoms, I considered abdominal aortic aneurysm, small bowel obstruction, pancreatitis, acute cholecystitis, acute appendecitis, peptic ulcer disease, gastritis, colitis, endocrine disorders, irritable bowel syndrome and other differential diagnosis an etiology of the patient's abdominal pain.    All labs were reviewed and interpreted by me.    MDM  Number of Diagnoses or Management Options  Lower abdominal pain  Diagnosis management comments: The patient is resting comfortably and feels better, is alert and in no distress. Repeat examination is unremarkable and benign; in particular, there's no discomfort at McBurney's point and there is no pulsatile mass. The history, exam, diagnostic testing, and current condition does not suggest acute appendicitis, bowel obstruction, acute cholecystitis, bowel perforation, major gastrointestinal bleeding, severe diverticulitis, abdominal aortic aneurysm, mesenteric ischemia, volvulus, sepsis, or other significant pathology that warrants further testing, continued ED treatment, admission, for surgical evaluation at this point. The vital signs have been stable. The patient does not have uncontrollable pain, intractable vomiting, or other significant symptoms. The patient's condition is stable and appropriate for discharge from the emergency department.         Amount and/or Complexity of Data Reviewed  Clinical lab tests: reviewed and ordered  Tests in the radiology section of CPT®: reviewed and ordered  Tests in the medicine section of CPT®: ordered and reviewed    Risk of Complications, Morbidity, and/or Mortality  Presenting problems: low  Diagnostic procedures: moderate  Management options: low    Patient  Progress  Patient progress: stable         Patient Care Considerations:    CT ABDOMEN AND PELVIS: I considered ordering a CT scan of the abdomen and pelvis however Patient's pain is more in the low pelvis.  Patient has no right lower quadrant tenderness no rebound or guarding to indicate findings or concerns about appendicitis      Consultants/Shared Management Plan:    None    Social Determinants of Health:    Patient is independent, reliable, and has access to care.       Disposition and Care Coordination:    Discharged: The patient is suitable and stable for discharge with no need for consideration of observation or admission.    I have explained the patient´s condition, diagnoses and treatment plan based on the information available to me at this time. I have answered questions and addressed any concerns. The patient has a good  understanding of the patient´s diagnosis, condition, and treatment plan as can be expected at this point. The vital signs have been stable. The patient´s condition is stable and appropriate for discharge from the emergency department.      The patient will pursue further outpatient evaluation with the primary care physician or other designated or consulting physician as outlined in the discharge instructions. They are agreeable to this plan of care and follow-up instructions have been explained in detail. The patient has received these instructions in written format and have expressed an understanding of the discharge instructions. The patient is aware that any significant change in condition or worsening of symptoms should prompt an immediate return to this or the closest emergency department or call to 911.    Final diagnoses:   Lower abdominal pain        ED Disposition     ED Disposition   Discharge    Condition   Stable    Comment   --             This medical record created using voice recognition software.           Crystal Ramirez, RAMIREZ  02/13/23 0647

## 2023-02-14 ENCOUNTER — TELEPHONE (OUTPATIENT)
Dept: INTERNAL MEDICINE | Facility: CLINIC | Age: 28
End: 2023-02-14
Payer: COMMERCIAL

## 2023-02-14 DIAGNOSIS — F41.9 ANXIETY: Primary | ICD-10-CM

## 2023-02-14 RX ORDER — BUPROPION HYDROCHLORIDE 150 MG/1
150 TABLET, EXTENDED RELEASE ORAL 2 TIMES DAILY
Qty: 270 TABLET | Refills: 1 | Status: SHIPPED | OUTPATIENT
Start: 2023-02-14 | End: 2023-02-22 | Stop reason: SDUPTHER

## 2023-02-14 NOTE — TELEPHONE ENCOUNTER
She thought it has said something about there being a cyst on her ovary? She was worried about that as well.

## 2023-02-14 NOTE — TELEPHONE ENCOUNTER
I called patient and she said she had some lab work done at hospital yesterday and US done, can you tell me what to tell patient based on results? She also said she doesn't really understand some of what she is looking at on lab results, she specifically;y mentioned her WBC being high?

## 2023-02-14 NOTE — TELEPHONE ENCOUNTER
Labs and ultrasound from the ER were normal.  The labs I ordered are a little different so I would still like her to get these done so we can continue working up. All the red numbers on CBC etc are just small changes in cell size, etc that aren't worrisome.

## 2023-02-15 NOTE — TELEPHONE ENCOUNTER
She did have two cysts but they are normal and not worrisome.  One is just a benign cyst like a freckle in lower vaginal area, the ovarian cyst is a normal ovulation type cyst.  Women get these every month during ovulation time.

## 2023-02-20 ENCOUNTER — TELEPHONE (OUTPATIENT)
Dept: INTERNAL MEDICINE | Facility: CLINIC | Age: 28
End: 2023-02-20
Payer: COMMERCIAL

## 2023-02-20 NOTE — TELEPHONE ENCOUNTER
Caller: Paradise Wilson    Relationship: Self    Best call back number:[phone]    What is the best time to reach you: ANYTIME     Who are you requesting to speak with (clinical staff, provider,  specific staff member): NURSE       What was the call regarding: PATIENT IS CALLING WANTING TO SPEAK TO A NURSE REGARDING SOME FEELINGS SHE IS HAVING AND WANTED TO SEE IF IT IS NORMAL DUE TO A CYST ON HER OVARIES. HAS BEEN HAPPENING FOR TWO DAYS.     Do you require a callback: YES

## 2023-02-22 ENCOUNTER — OFFICE VISIT (OUTPATIENT)
Dept: INTERNAL MEDICINE | Facility: CLINIC | Age: 28
End: 2023-02-22
Payer: COMMERCIAL

## 2023-02-22 VITALS
WEIGHT: 184.4 LBS | BODY MASS INDEX: 37.24 KG/M2 | DIASTOLIC BLOOD PRESSURE: 63 MMHG | OXYGEN SATURATION: 100 % | HEART RATE: 90 BPM | SYSTOLIC BLOOD PRESSURE: 122 MMHG | TEMPERATURE: 97.7 F

## 2023-02-22 DIAGNOSIS — F41.9 ANXIETY: Primary | ICD-10-CM

## 2023-02-22 DIAGNOSIS — J02.9 SORE THROAT: ICD-10-CM

## 2023-02-22 DIAGNOSIS — N97.9 INFERTILITY, FEMALE: ICD-10-CM

## 2023-02-22 DIAGNOSIS — E66.9 OBESITY (BMI 30.0-34.9): ICD-10-CM

## 2023-02-22 PROCEDURE — 99214 OFFICE O/P EST MOD 30 MIN: CPT | Performed by: INTERNAL MEDICINE

## 2023-02-22 RX ORDER — BUPROPION HYDROCHLORIDE 150 MG/1
150 TABLET, EXTENDED RELEASE ORAL DAILY
Qty: 90 TABLET | Refills: 1
Start: 2023-02-22

## 2023-02-23 ENCOUNTER — PATIENT MESSAGE (OUTPATIENT)
Dept: INTERNAL MEDICINE | Facility: CLINIC | Age: 28
End: 2023-02-23
Payer: COMMERCIAL

## 2023-03-24 ENCOUNTER — HOSPITAL ENCOUNTER (EMERGENCY)
Facility: HOSPITAL | Age: 28
Discharge: HOME | End: 2023-03-24
Attending: EMERGENCY MEDICINE | Admitting: EMERGENCY MEDICINE
Payer: COMMERCIAL

## 2023-03-24 VITALS
BODY MASS INDEX: 35.88 KG/M2 | HEIGHT: 60 IN | DIASTOLIC BLOOD PRESSURE: 88 MMHG | RESPIRATION RATE: 18 BRPM | SYSTOLIC BLOOD PRESSURE: 115 MMHG | OXYGEN SATURATION: 99 % | TEMPERATURE: 98 F | HEART RATE: 99 BPM | WEIGHT: 182.76 LBS

## 2023-03-24 DIAGNOSIS — T50.905A ADVERSE EFFECT OF DRUG, INITIAL ENCOUNTER: Primary | ICD-10-CM

## 2023-03-24 PROCEDURE — 93010 ELECTROCARDIOGRAM REPORT: CPT | Performed by: INTERNAL MEDICINE

## 2023-03-24 PROCEDURE — 99282 EMERGENCY DEPT VISIT SF MDM: CPT

## 2023-03-24 PROCEDURE — 93005 ELECTROCARDIOGRAM TRACING: CPT

## 2023-03-24 PROCEDURE — 93005 ELECTROCARDIOGRAM TRACING: CPT | Performed by: EMERGENCY MEDICINE

## 2023-03-24 NOTE — ED PROVIDER NOTES
Time: 1:41 PM EDT  Date of encounter:  3/24/2023  Independent Historian/Clinical History and Information was obtained by:   Patient  Chief Complaint: possible allergic reaction    History is limited by: N/A    History of Present Illness:  Patient is a 27 y.o. year old female who presents to the emergency department for evaluation of possible allergic reaction since yesterday. She c/o palpitations, CP and face feeling flushed. Pt started oral steroids (tapered) 3-4 days ago for poison ivy. Her PCP told her to stop the steroid yesterday due to possible allergic reaction, she took 2 pills yesterday. Denies hives, SOB, wheezing, itching.      History provided by:  Patient   used: No        Patient Care Team  Primary Care Provider: Tere mE MD    Past Medical History:     No Known Allergies  Past Medical History:   Diagnosis Date   • Anxiety    • Dysmenorrhea      Past Surgical History:   Procedure Laterality Date   • TUBAL ABDOMINAL LIGATION  2018   • WISDOM TOOTH EXTRACTION       Family History   Problem Relation Age of Onset   • Heart disease Maternal Grandmother    • Diabetes type II Maternal Grandmother         mellitus   • Stroke Other        Home Medications:  Prior to Admission medications    Medication Sig Start Date End Date Taking? Authorizing Provider   buPROPion SR (Wellbutrin SR) 150 MG 12 hr tablet Take 1 tablet by mouth Daily. 2/22/23   Tere Em MD   diclofenac (VOLTAREN) 50 MG EC tablet Take 1 tablet by mouth 3 (Three) Times a Day As Needed (Pain). 2/13/23   Crystal Ramirez APRN   dicyclomine (BENTYL) 20 MG tablet Take 1 tablet by mouth Every 6 (Six) Hours. 2/13/23   Crystal Ramirez APRN   fluticasone (FLONASE) 50 MCG/ACT nasal spray 2 sprays into the nostril(s) as directed by provider Daily. 4/25/22   Pete Ballard PA   ondansetron ODT (ZOFRAN-ODT) 4 MG disintegrating tablet Place 1 tablet on the tongue Every 8 (Eight) Hours As Needed for Nausea or Vomiting.  "2/13/23   Crystal Ramirez APRN        Social History:   Social History     Tobacco Use   • Smoking status: Never   • Smokeless tobacco: Never   Vaping Use   • Vaping Use: Never used   Substance Use Topics   • Alcohol use: Never   • Drug use: Never         Review of Systems:  Review of Systems   Constitutional: Negative for chills and fever.   HENT: Negative for congestion, ear pain and sore throat.         Face feels flushed   Eyes: Negative for pain.   Respiratory: Negative for cough, chest tightness and shortness of breath.    Cardiovascular: Positive for chest pain and palpitations.   Gastrointestinal: Negative for abdominal pain, diarrhea, nausea and vomiting.   Genitourinary: Negative for flank pain and hematuria.   Musculoskeletal: Negative for joint swelling.   Skin: Negative for pallor.   Neurological: Negative for seizures and headaches.   All other systems reviewed and are negative.       Physical Exam:  /88 (BP Location: Right arm, Patient Position: Sitting)   Pulse 99   Temp 98 °F (36.7 °C) (Oral)   Resp 18   Ht 152.4 cm (60\")   Wt 82.9 kg (182 lb 12.2 oz)   SpO2 99%   BMI 35.69 kg/m²     Physical Exam  Vitals and nursing note reviewed.   Constitutional:       General: She is not in acute distress.     Appearance: Normal appearance. She is not toxic-appearing.   HENT:      Head: Normocephalic and atraumatic.      Mouth/Throat:      Mouth: Mucous membranes are moist.   Eyes:      General: No scleral icterus.  Cardiovascular:      Rate and Rhythm: Normal rate and regular rhythm.      Pulses: Normal pulses.      Heart sounds: Normal heart sounds. No murmur heard.  Pulmonary:      Effort: Pulmonary effort is normal. No respiratory distress.      Breath sounds: Normal breath sounds.   Abdominal:      General: Abdomen is flat.      Palpations: Abdomen is soft.      Tenderness: There is no abdominal tenderness.   Musculoskeletal:         General: Normal range of motion.      Cervical back: Normal " range of motion and neck supple.   Skin:     General: Skin is warm and dry.      Findings: No rash.      Comments: Cheeks appeared flushed. No rash or hives noted anywhere   Neurological:      Mental Status: She is alert and oriented to person, place, and time. Mental status is at baseline.                  Procedures:  Procedures      Medical Decision Making:      Comorbidities that affect care:    None    External Notes reviewed:    Previous Clinic Note: Care note diagnosed with poison ivy and started on Medrol Dosepak      The following orders were placed and all results were independently analyzed by me:  Orders Placed This Encounter   Procedures   • ECG 12 Lead Chest Pain       Medications Given in the Emergency Department:  Medications - No data to display     ED Course:    ED Course as of 03/24/23 1510   Fri Mar 24, 2023   1501 EKG performed at 1249 was interpreted by me to show a normal sinus rhythm with a ventricular rate of 84 bpm.  The parable is 137 ms.  P waves are normal.  QRS interval is normal.  Axis at 60 degrees.  There is no acute ischemic ST or T wave change identified.  QT corrected was 411 ms. [TB]      ED Course User Index  [TB] Shaan Chatterjee DO   The patient was seen and evaluated the ED by me.  The above history and physical examination was performed as documented.  Diagnostic data was obtained.  Results reviewed.  After seen evaluate the patient I feel her symptoms are more likely an adverse reaction to the Medrol Dosepak and not a true allergic reaction.  I did advise her to discontinue her Medrol Dosepak especially since she has no active skin rash or lesions.  Advised her to take Benadryl as needed for symptoms.  Patient to follow-up with her family doctor first week if symptoms or not resolved.    Labs:    Lab Results (last 24 hours)     ** No results found for the last 24 hours. **           Imaging:    No Radiology Exams Resulted Within Past 24 Hours      Differential Diagnosis and  Discussion:    Palpitations: Differential diagnosis includes but is not limited to anxiety, atrioventricular blocks, mitral valve disease, hypoxia, coronary artery disease, hypokalemia, anemia, fever, COPD, congestive heart failure, pericarditis, Graciela-Parkinson-White syndrome, pulmonary embolism, SVT, atrial fibrillation, atrial flutter, sinus tachycardia, thyrotoxicosis, and pheochromocytoma.    EKG was interpreted by me.    MDM         Patient Care Considerations:          Consultants/Shared Management Plan:    None    Social Determinants of Health:    Patient is independent, reliable, and has access to care.       Disposition and Care Coordination:    Discharged: The patient is suitable and stable for discharge with no need for consideration of observation or admission.    I have explained the patient´s condition, diagnoses and treatment plan based on the information available to me at this time. I have answered questions and addressed any concerns. The patient has a good  understanding of the patient´s diagnosis, condition, and treatment plan as can be expected at this point. The vital signs have been stable. The patient´s condition is stable and appropriate for discharge from the emergency department.      The patient will pursue further outpatient evaluation with the primary care physician or other designated or consulting physician as outlined in the discharge instructions. They are agreeable to this plan of care and follow-up instructions have been explained in detail. The patient has received these instructions in written format and have expressed an understanding of the discharge instructions. The patient is aware that any significant change in condition or worsening of symptoms should prompt an immediate return to this or the closest emergency department or call to 911.    Final diagnoses:   Adverse effect of drug, initial encounter        ED Disposition     ED Disposition   Discharge    Condition    Stable    Comment   --             This medical record created using voice recognition software.    Documentation assistance provided by Cici Hammonds acting as scribe for Shaan Chatterjee DO Information recorded by the scribe was done at my direction and has been verified and validated by me.          Cici Hammonds  03/24/23 1351       Cici Hammonds  03/24/23 1357       Shaan Chatterjee DO  03/24/23 [id number]

## 2023-03-25 LAB — QT INTERVAL: 347 MS

## 2023-06-29 NOTE — TELEPHONE ENCOUNTER
Caller: Paradise Wilson    Relationship: Self    Best call back number: [phone]      Requested Prescriptions:   Requested Prescriptions     Pending Prescriptions Disp Refills   • Vortioxetine HBr (Trintellix) 10 MG tablet 90 tablet 0     Sig: Take 10 mg by mouth Daily.      30 DAY SUPPLY WITH REFILLS    Pharmacy where request should be sent: [facility] [address] [phone]  - [phone] FX     Additional details provided by patient: PATIENT REPORTS HER INSURANCE WILL ONLY PAY FOR 30 DAY SUPPLY        Does the patient have less than a 3 day supply:  [] Yes  [x] No    Kalen Mancera Rep   05/04/22 14:28 EDT            Faxed form to [employer] with 3 recent clinic notes

## 2023-07-18 PROBLEM — L23.9 ALLERGIC CONTACT DERMATITIS: Status: ACTIVE | Noted: 2023-07-18

## 2023-07-26 ENCOUNTER — TELEPHONE (OUTPATIENT)
Dept: INTERNAL MEDICINE | Facility: CLINIC | Age: 28
End: 2023-07-26

## 2023-07-26 NOTE — TELEPHONE ENCOUNTER
Pt called letting us know that she missed her Derm apt due to going to the wrong location. She can not get into a new Derm until September. She was hoping she could get another steroid shot.  Please contact pt if she can get put on the Nurse schedule to get the shot.

## 2023-07-27 RX ORDER — PREDNISONE 20 MG/1
20 TABLET ORAL DAILY
Qty: 5 TABLET | Refills: 0 | Status: SHIPPED | OUTPATIENT
Start: 2023-07-27 | End: 2023-08-01

## 2023-07-27 NOTE — TELEPHONE ENCOUNTER
Patient notified medication sent to pharmacy for worsening symptoms go to urgent care or Emergency room. If no better follow up apt scheduled with Anali on 7/30/23. Patient may cancel on Monday with out it counting against her because we need time to see if medication works.

## 2023-07-27 NOTE — TELEPHONE ENCOUNTER
Patient called back wants a steroid shot rash is spreading on left side from ribs down hip and buttock , moving to right side.

## 2023-08-30 ENCOUNTER — OFFICE VISIT (OUTPATIENT)
Dept: INTERNAL MEDICINE | Facility: CLINIC | Age: 28
End: 2023-08-30
Payer: COMMERCIAL

## 2023-08-30 VITALS
BODY MASS INDEX: 37.11 KG/M2 | WEIGHT: 190 LBS | RESPIRATION RATE: 16 BRPM | HEART RATE: 75 BPM | SYSTOLIC BLOOD PRESSURE: 130 MMHG | DIASTOLIC BLOOD PRESSURE: 65 MMHG

## 2023-08-30 DIAGNOSIS — F41.9 ANXIETY: Primary | ICD-10-CM

## 2023-08-30 DIAGNOSIS — L23.9 ALLERGIC CONTACT DERMATITIS, UNSPECIFIED TRIGGER: ICD-10-CM

## 2023-08-30 DIAGNOSIS — N64.4 BREAST TENDERNESS: ICD-10-CM

## 2023-08-30 DIAGNOSIS — N92.6 ABNORMAL MENSTRUAL PERIODS: ICD-10-CM

## 2023-08-30 LAB
ALBUMIN SERPL-MCNC: 4.5 G/DL (ref 3.5–5.2)
ALBUMIN/GLOB SERPL: 1.6 G/DL
ALP SERPL-CCNC: 86 U/L (ref 39–117)
ALT SERPL W P-5'-P-CCNC: 20 U/L (ref 1–33)
ANION GAP SERPL CALCULATED.3IONS-SCNC: 9.6 MMOL/L (ref 5–15)
AST SERPL-CCNC: 23 U/L (ref 1–32)
BASOPHILS # BLD AUTO: 0.05 10*3/MM3 (ref 0–0.2)
BASOPHILS NFR BLD AUTO: 0.6 % (ref 0–1.5)
BILIRUB SERPL-MCNC: 0.3 MG/DL (ref 0–1.2)
BUN SERPL-MCNC: 12 MG/DL (ref 6–20)
BUN/CREAT SERPL: 14 (ref 7–25)
CALCIUM SPEC-SCNC: 9.5 MG/DL (ref 8.6–10.5)
CHLORIDE SERPL-SCNC: 101 MMOL/L (ref 98–107)
CO2 SERPL-SCNC: 27.4 MMOL/L (ref 22–29)
CREAT SERPL-MCNC: 0.86 MG/DL (ref 0.57–1)
DEPRECATED RDW RBC AUTO: 41.3 FL (ref 37–54)
EGFRCR SERPLBLD CKD-EPI 2021: 94.5 ML/MIN/1.73
EOSINOPHIL # BLD AUTO: 0.21 10*3/MM3 (ref 0–0.4)
EOSINOPHIL NFR BLD AUTO: 2.7 % (ref 0.3–6.2)
ERYTHROCYTE [DISTWIDTH] IN BLOOD BY AUTOMATED COUNT: 13.7 % (ref 12.3–15.4)
GLOBULIN UR ELPH-MCNC: 2.9 GM/DL
GLUCOSE SERPL-MCNC: 94 MG/DL (ref 65–99)
HCT VFR BLD AUTO: 38.3 % (ref 34–46.6)
HGB BLD-MCNC: 12.6 G/DL (ref 12–15.9)
IMM GRANULOCYTES # BLD AUTO: 0.01 10*3/MM3 (ref 0–0.05)
IMM GRANULOCYTES NFR BLD AUTO: 0.1 % (ref 0–0.5)
LYMPHOCYTES # BLD AUTO: 3.36 10*3/MM3 (ref 0.7–3.1)
LYMPHOCYTES NFR BLD AUTO: 43.1 % (ref 19.6–45.3)
MCH RBC QN AUTO: 27.3 PG (ref 26.6–33)
MCHC RBC AUTO-ENTMCNC: 32.9 G/DL (ref 31.5–35.7)
MCV RBC AUTO: 82.9 FL (ref 79–97)
MONOCYTES # BLD AUTO: 0.75 10*3/MM3 (ref 0.1–0.9)
MONOCYTES NFR BLD AUTO: 9.6 % (ref 5–12)
NEUTROPHILS NFR BLD AUTO: 3.42 10*3/MM3 (ref 1.7–7)
NEUTROPHILS NFR BLD AUTO: 43.9 % (ref 42.7–76)
NRBC BLD AUTO-RTO: 0 /100 WBC (ref 0–0.2)
PLATELET # BLD AUTO: 262 10*3/MM3 (ref 140–450)
PMV BLD AUTO: 10.5 FL (ref 6–12)
POTASSIUM SERPL-SCNC: 4.2 MMOL/L (ref 3.5–5.2)
PROLACTIN SERPL-MCNC: 11.5 NG/ML (ref 4.79–23.3)
PROT SERPL-MCNC: 7.4 G/DL (ref 6–8.5)
RBC # BLD AUTO: 4.62 10*6/MM3 (ref 3.77–5.28)
SODIUM SERPL-SCNC: 138 MMOL/L (ref 136–145)
WBC NRBC COR # BLD: 7.8 10*3/MM3 (ref 3.4–10.8)

## 2023-08-30 PROCEDURE — 86003 ALLG SPEC IGE CRUDE XTRC EA: CPT | Performed by: INTERNAL MEDICINE

## 2023-08-30 PROCEDURE — 85025 COMPLETE CBC W/AUTO DIFF WBC: CPT | Performed by: INTERNAL MEDICINE

## 2023-08-30 PROCEDURE — 80053 COMPREHEN METABOLIC PANEL: CPT | Performed by: INTERNAL MEDICINE

## 2023-08-30 PROCEDURE — 84146 ASSAY OF PROLACTIN: CPT | Performed by: INTERNAL MEDICINE

## 2023-08-30 PROCEDURE — 84702 CHORIONIC GONADOTROPIN TEST: CPT | Performed by: INTERNAL MEDICINE

## 2023-08-30 PROCEDURE — 86008 ALLG SPEC IGE RECOMB EA: CPT | Performed by: INTERNAL MEDICINE

## 2023-08-30 PROCEDURE — 82785 ASSAY OF IGE: CPT | Performed by: INTERNAL MEDICINE

## 2023-08-30 NOTE — PROGRESS NOTES
Chief Complaint  Follow up on anxiety    Subjective      [facility] presents to [facility]  History of Present Illness    States that she really like the wellbutrin and felt like it helped but she developed a rash  She wasn't sure if rash was related but stopped wellbutrin    Rash appeared in mostly truncal areas, was patch like and erythematous, slightly raised and coalescing  Very itchy  She was seen here for it  Took awhile to go away   Has occurred a few times off and on    Objective   Vital Signs:   /65   Pulse 75   Resp 16   Wt 86.2 kg (190 lb)   BMI 37.11 kg/mý     Wt Readings from Last 3 Encounters:   08/30/23 86.2 kg (190 lb)   07/20/23 81.6 kg (180 lb)   07/18/23 83 kg (183 lb)     BP Readings from Last 3 Encounters:   08/30/23 130/65   07/20/23 125/83   07/18/23 110/80         Physical Exam  Vitals reviewed.   Constitutional:       Appearance: Normal appearance. She is well-developed.   HENT:      Head: Normocephalic and atraumatic.      Right Ear: External ear normal.      Left Ear: External ear normal.   Eyes:      Conjunctiva/sclera: Conjunctivae normal.      Pupils: Pupils are equal, round, and reactive to light.   Cardiovascular:      Rate and Rhythm: Normal rate and regular rhythm.      Heart sounds: No murmur heard.    No friction rub. No gallop.   Pulmonary:      Effort: Pulmonary effort is normal.      Breath sounds: Normal breath sounds. No wheezing or rhonchi.   Skin:     General: Skin is warm and dry.   Neurological:      Mental Status: She is alert and oriented to person, place, and time.   Psychiatric:         Mood and Affect: Affect normal.         Behavior: Behavior normal.         Thought Content: Thought content normal.        Result Review :  The following data was reviewed by: Tere Em MD on 08/30/2023:    LABS      Common labs          12/14/2022    18:12 2/13/2023    04:45 8/30/2023    17:00   Common Labs    Glucose 94  105  94    BUN 10  17  12    Creatinine 0.75  0.82  0.86    Sodium 140  135  138    Potassium 4.0  3.9  4.2    Chloride 102  100  101    Calcium 9.6  9.1  9.5    Albumin 4.40  4.3  4.5    Total Bilirubin 0.2  0.3  0.3    Alkaline Phosphatase 95  94  86    AST (SGOT) 26  20  23    ALT (SGPT) 19  16  20    WBC 9.83  8.72  7.80    Hemoglobin 12.4  13.1  12.6    Hematocrit 35.7  38.0  38.3    Platelets 245  229  262    Total Cholesterol 164      Triglycerides 269      HDL Cholesterol 35      LDL Cholesterol  84          Office Visit on 08/30/2023   Component Date Value    Prolactin 08/30/2023 11.50     Glucose 08/30/2023 94     BUN 08/30/2023 12     Creatinine 08/30/2023 0.86     Sodium 08/30/2023 138     Potassium 08/30/2023 4.2     Chloride 08/30/2023 101     CO2 08/30/2023 27.4     Calcium 08/30/2023 9.5     Total Protein 08/30/2023 7.4     Albumin 08/30/2023 4.5     ALT (SGPT) 08/30/2023 20     AST (SGOT) 08/30/2023 23     Alkaline Phosphatase 08/30/2023 86     Total Bilirubin 08/30/2023 0.3     Globulin 08/30/2023 2.9     A/G Ratio 08/30/2023 1.6     BUN/Creatinine Ratio 08/30/2023 14.0     Anion Gap 08/30/2023 9.6     eGFR 08/30/2023 94.5     WBC 08/30/2023 7.80     RBC 08/30/2023 4.62     Hemoglobin 08/30/2023 12.6     Hematocrit 08/30/2023 38.3     MCV 08/30/2023 82.9     MCH 08/30/2023 27.3     MCHC 08/30/2023 32.9     RDW 08/30/2023 13.7     RDW-SD 08/30/2023 41.3     MPV 08/30/2023 10.5     Platelets 08/30/2023 262     Neutrophil % 08/30/2023 43.9     Lymphocyte % 08/30/2023 43.1     Monocyte % 08/30/2023 9.6     Eosinophil % 08/30/2023 2.7     Basophil % 08/30/2023 0.6     Immature Grans % 08/30/2023 0.1     Neutrophils, Absolute 08/30/2023 3.42     Lymphocytes, Absolute 08/30/2023 3.36 (H)     Monocytes, Absolute 08/30/2023 0.75     Eosinophils, Absolute 08/30/2023 0.21     Basophils, Absolute 08/30/2023 0.05     Immature Grans, Absolute 08/30/2023 0.01     nRBC 08/30/2023 0.0        Lab  Results   Component Value Date    SARSANTIGEN Not Detected 04/25/2022    COVID19 DETECTED (A) 01/09/2021    RAPFLUA Negative 04/25/2022    RAPFLUB Negative 04/25/2022    INR 0.98 (L) 01/22/2021    BILIRUBINUR Negative 02/13/2023    POCGLU 113 (H) 01/22/2021        IMAGING  No Images in the past 120 days found..    Procedures         HEALTH MAINTENANCE   Class 2 Severe Obesity (BMI >=35 and <=39.9). Obesity-related health conditions include the following: none. Obesity is worsening. BMI is is above average; BMI management plan is completed. We discussed portion control and increasing exercise.       Social History     Tobacco Use   Smoking Status Never   Smokeless Tobacco Never              Assessment and Plan   Diagnoses and all orders for this visit:    1. Anxiety (Primary)  Comments:  will try to restart wellbutrin, will monitor closely for rash  Orders:  -     Alpha-Gal IgE Panel; Future  -     Ambulatory Referral to Allergy  -     Alpha-Gal IgE Panel    2. Allergic contact dermatitis, unspecified trigger  Comments:  unsure of etiology, will refer to allergy for possibly skin prick or patch testing  Orders:  -     Alpha-Gal IgE Panel; Future  -     Ambulatory Referral to Allergy  -     Alpha-Gal IgE Panel    3. Breast tenderness  Comments:  not a concern today, but has been on and off in the past, will check levles today  Orders:  -     Prolactin  -     Comprehensive metabolic panel  -     CBC w AUTO Differential  -     HCG, B-subunit, Quantitative    4. Abnormal menstrual periods  Comments:  will check labs and can refer to gyn if needed  Orders:  -     Prolactin  -     Comprehensive metabolic panel  -     CBC w AUTO Differential  -     HCG, B-subunit, Quantitative              FOLLOW UP  Return in about 3 months (around 11/30/2023).  Patient was given instructions and counseling regarding her condition or for health maintenance advice. Please see specific information pulled into the AVS if appropriate.        Tere Em MD  08/31/23  10:52 EDT    CURRENT & DISCONTINUED MEDICATIONS  Current Outpatient Medications   Medication Instructions    buPROPion SR (WELLBUTRIN SR) 150 mg, Oral, Daily    fluticasone (FLONASE) 50 MCG/ACT nasal spray 2 sprays, Nasal, Daily    triamcinolone (KENALOG) 0.1 % cream 1 application , Topical, 2 Times Daily       There are no discontinued medications.

## 2023-09-01 LAB — HCG INTACT+B SERPL-ACNC: <1 MIU/ML

## 2023-09-05 LAB
ALPHA-GAL IGE QN: <0.1 KU/L
BEEF IGE QN: <0.1 KU/L
CONV CLASS DESCRIPTION: NORMAL
IGE SERPL-ACNC: 6 IU/ML (ref 6–495)
LAMB IGE QN: <0.1 KU/L
PORK IGE QN: <0.1 KU/L

## 2023-10-26 ENCOUNTER — TELEPHONE (OUTPATIENT)
Dept: INTERNAL MEDICINE | Facility: CLINIC | Age: 28
End: 2023-10-26

## 2023-10-26 NOTE — TELEPHONE ENCOUNTER
Hub staff attempted to follow warm transfer process and was unsuccessful     Caller: Paradise Wilson A    Relationship to patient: Self    Best call back number: [phone]    Patient is needing: PATIENT CALLED REQUESTING TO ONLY SPEAK WITH A NURSE. PATIENT STATE SHE HAS BEEN HAVING NAUSEA/VOMITING FOR ALMOST A WEEK THAT CONTINUES TO HAPPEN AFTER SHE EATS AND IS WANTING TO KNOW WHAT MD VARGAS RECOMMENDS.

## 2023-11-26 ENCOUNTER — APPOINTMENT (OUTPATIENT)
Dept: CT IMAGING | Facility: HOSPITAL | Age: 28
End: 2023-11-26
Payer: COMMERCIAL

## 2023-11-26 ENCOUNTER — HOSPITAL ENCOUNTER (EMERGENCY)
Facility: HOSPITAL | Age: 28
Discharge: HOME | End: 2023-11-26
Attending: EMERGENCY MEDICINE | Admitting: EMERGENCY MEDICINE
Payer: COMMERCIAL

## 2023-11-26 VITALS
RESPIRATION RATE: 20 BRPM | HEART RATE: 97 BPM | BODY MASS INDEX: 37.87 KG/M2 | SYSTOLIC BLOOD PRESSURE: 140 MMHG | WEIGHT: 192.9 LBS | DIASTOLIC BLOOD PRESSURE: 96 MMHG | HEIGHT: 60 IN | OXYGEN SATURATION: 100 % | TEMPERATURE: 97.9 F

## 2023-11-26 DIAGNOSIS — J01.00 ACUTE MAXILLARY SINUSITIS, RECURRENCE NOT SPECIFIED: Primary | ICD-10-CM

## 2023-11-26 DIAGNOSIS — M54.2 NECK PAIN ON LEFT SIDE: ICD-10-CM

## 2023-11-26 DIAGNOSIS — H92.02 LEFT EAR PAIN: ICD-10-CM

## 2023-11-26 DIAGNOSIS — R51.9 NONINTRACTABLE HEADACHE, UNSPECIFIED CHRONICITY PATTERN, UNSPECIFIED HEADACHE TYPE: ICD-10-CM

## 2023-11-26 PROCEDURE — 25010000002 DIPHENHYDRAMINE PER 50 MG: Performed by: NURSE PRACTITIONER

## 2023-11-26 PROCEDURE — 99284 EMERGENCY DEPT VISIT MOD MDM: CPT

## 2023-11-26 PROCEDURE — 25010000002 KETOROLAC TROMETHAMINE PER 15 MG: Performed by: NURSE PRACTITIONER

## 2023-11-26 PROCEDURE — 25010000002 METOCLOPRAMIDE PER 10 MG: Performed by: NURSE PRACTITIONER

## 2023-11-26 PROCEDURE — 96374 THER/PROPH/DIAG INJ IV PUSH: CPT

## 2023-11-26 PROCEDURE — 96375 TX/PRO/DX INJ NEW DRUG ADDON: CPT

## 2023-11-26 PROCEDURE — 70450 CT HEAD/BRAIN W/O DYE: CPT

## 2023-11-26 RX ORDER — KETOROLAC TROMETHAMINE 30 MG/ML
30 INJECTION, SOLUTION INTRAMUSCULAR; INTRAVENOUS ONCE
Status: COMPLETED | OUTPATIENT
Start: 2023-11-26 | End: 2023-11-26

## 2023-11-26 RX ORDER — AMOXICILLIN AND CLAVULANATE POTASSIUM 875; 125 MG/1; MG/1
1 TABLET, FILM COATED ORAL 2 TIMES DAILY
Qty: 20 TABLET | Refills: 0 | Status: SHIPPED | OUTPATIENT
Start: 2023-11-26 | End: 2023-12-06

## 2023-11-26 RX ORDER — CETIRIZINE HYDROCHLORIDE, PSEUDOEPHEDRINE HYDROCHLORIDE 5; 120 MG/1; MG/1
1 TABLET, FILM COATED, EXTENDED RELEASE ORAL 2 TIMES DAILY
Qty: 20 TABLET | Refills: 0 | Status: SHIPPED | OUTPATIENT
Start: 2023-11-26 | End: 2023-12-06

## 2023-11-26 RX ORDER — METOCLOPRAMIDE HYDROCHLORIDE 5 MG/ML
10 INJECTION INTRAMUSCULAR; INTRAVENOUS ONCE
Status: COMPLETED | OUTPATIENT
Start: 2023-11-26 | End: 2023-11-26

## 2023-11-26 RX ORDER — DIPHENHYDRAMINE HYDROCHLORIDE 50 MG/ML
25 INJECTION INTRAMUSCULAR; INTRAVENOUS ONCE
Status: COMPLETED | OUTPATIENT
Start: 2023-11-26 | End: 2023-11-26

## 2023-11-26 RX ADMIN — KETOROLAC TROMETHAMINE 30 MG: 30 INJECTION, SOLUTION INTRAMUSCULAR; INTRAVENOUS at 04:13

## 2023-11-26 RX ADMIN — DIPHENHYDRAMINE HYDROCHLORIDE 25 MG: 50 INJECTION, SOLUTION INTRAMUSCULAR; INTRAVENOUS at 04:15

## 2023-11-26 RX ADMIN — METOCLOPRAMIDE HYDROCHLORIDE 10 MG: 5 INJECTION INTRAMUSCULAR; INTRAVENOUS at 04:16

## 2023-11-26 NOTE — ED PROVIDER NOTES
Time: 3:29 AM EST  Date of encounter:  11/26/2023  Independent Historian/Clinical History and Information was obtained by:   Patient    History is limited by: N/A    Chief Complaint: Headache      History of Present Illness:  Patient is a 28 y.o. year old female who presents to the emergency department for evaluation of left-sided headache patient states she has left-sided headache that seems to go into her left ear and into her left side of her neck all week.  Sometimes aching but head pain is often burning.  No fever.  No photophobia.  No nausea or vomiting no other URI symptoms..  No trauma    HPI    Patient Care Team  Primary Care Provider: Tere Em MD    Past Medical History:     Allergies   Allergen Reactions    Methylprednisolone Other (See Comments)     Rapid Heart rate, Elevated HTN, flushed in face     Past Medical History:   Diagnosis Date    Anxiety     Dysmenorrhea      Past Surgical History:   Procedure Laterality Date    TUBAL ABDOMINAL LIGATION  2018    WISDOM TOOTH EXTRACTION       Family History   Problem Relation Age of Onset    Heart disease Maternal Grandmother     Diabetes type II Maternal Grandmother         mellitus    Stroke Other        Home Medications:  Prior to Admission medications    Medication Sig Start Date End Date Taking? Authorizing Provider   buPROPion SR (Wellbutrin SR) 150 MG 12 hr tablet Take 1 tablet by mouth Daily. 2/22/23   Tere Em MD   fluticasone (FLONASE) 50 MCG/ACT nasal spray 2 sprays into the nostril(s) as directed by provider Daily. 4/25/22   Pete Ballard PA   triamcinolone (KENALOG) 0.1 % cream Apply 1 application  topically to the appropriate area as directed 2 (Two) Times a Day. 7/18/23   Anali Bueno PA-C        Social History:   Social History     Tobacco Use    Smoking status: Never    Smokeless tobacco: Never   Vaping Use    Vaping Use: Never used   Substance Use Topics    Alcohol use: Never    Drug use: Never         Review of  "Systems:  Review of Systems   Constitutional:  Negative for chills and fever.   HENT:  Positive for ear pain. Negative for congestion and sore throat.    Eyes:  Negative for pain.   Respiratory:  Negative for cough, chest tightness and shortness of breath.    Cardiovascular:  Negative for chest pain.   Gastrointestinal:  Negative for abdominal pain, diarrhea, nausea and vomiting.   Genitourinary:  Negative for flank pain and hematuria.   Musculoskeletal:  Positive for neck pain (Pain from head shoots down into ear and neck). Negative for joint swelling.   Skin:  Negative for pallor.   Neurological:  Positive for headaches. Negative for seizures.   All other systems reviewed and are negative.       Physical Exam:  /96 (BP Location: Left arm, Patient Position: Sitting)   Pulse 97   Temp 97.9 °F (36.6 °C) (Oral)   Resp 20   Ht 152.4 cm (60\")   Wt 87.5 kg (192 lb 14.4 oz)   LMP 11/08/2023 (Approximate)   SpO2 100%   BMI 37.67 kg/m²     Physical Exam  Vitals and nursing note reviewed.   Constitutional:       General: She is not in acute distress.     Appearance: Normal appearance. She is not toxic-appearing.   HENT:      Head: Normocephalic and atraumatic.      Right Ear: Tympanic membrane, ear canal and external ear normal.      Left Ear: Tympanic membrane, ear canal and external ear normal.      Nose: Nose normal.      Mouth/Throat:      Mouth: Mucous membranes are moist.      Comments: No dental tenderness.    No tonsillar exudate  Eyes:      General: No scleral icterus.     Conjunctiva/sclera: Conjunctivae normal.   Neck:      Comments: No reproducible pain    No unusual swelling or mass noted  Cardiovascular:      Rate and Rhythm: Normal rate and regular rhythm.      Heart sounds: Normal heart sounds.   Pulmonary:      Effort: Pulmonary effort is normal. No respiratory distress.      Breath sounds: Normal breath sounds.   Abdominal:      Tenderness: There is no abdominal tenderness.   Musculoskeletal:  "        General: Normal range of motion.      Cervical back: Normal range of motion and neck supple. No rigidity or tenderness.   Lymphadenopathy:      Cervical: No cervical adenopathy.   Skin:     General: Skin is warm and dry.   Neurological:      Mental Status: She is alert and oriented to person, place, and time.      Cranial Nerves: No cranial nerve deficit.      Sensory: No sensory deficit.      Motor: No weakness.   Psychiatric:         Mood and Affect: Mood normal.         Behavior: Behavior normal.                Medical Decision Making:      Comorbidities that affect care:    Anxiety, migraine    External Notes reviewed:    Previous Clinic Note: Patient's last visit was with her PCP on August 30 for anxiety and contact otitis      The following orders were placed and all results were independently analyzed by me:  Orders Placed This Encounter   Procedures    CT Head Without Contrast       Medications Given in the Emergency Department:  Medications   ketorolac (TORADOL) injection 30 mg (30 mg Intravenous Given 11/26/23 0413)   metoclopramide (REGLAN) injection 10 mg (10 mg Intravenous Given 11/26/23 0416)   diphenhydrAMINE (BENADRYL) injection 25 mg (25 mg Intravenous Given 11/26/23 0415)        ED Course:    ED Course as of 11/26/23 0441   Sun Nov 26, 2023   0434 CT Head Without Contrast  Left-sided sinus disease [DS]      ED Course User Index  [DS] Crystal Ramirez APRN       Labs:    Lab Results (last 24 hours)       ** No results found for the last 24 hours. **             Imaging:    CT Head Without Contrast    Result Date: 11/26/2023  PROCEDURE: CT HEAD WO CONTRAST  COMPARISON: 1/22/2021.  INDICATIONS: Headache.  PROTOCOL:   Standard CT imaging protocol performed.    RADIATION:   Total DLP: 954 mGy*cm   MA and/or KV were/was adjusted to minimize radiation dose.    TECHNIQUE: After obtaining the patient's consent, 122 CT images were obtained without non-ionic intravenous contrast material.  DISCUSSION:   BERNARD  routine nonenhanced head CT was performed.  No acute brain abnormality is identified.  No acute intracranial hemorrhage.  No acute infarct is seen.  No acute skull fracture.  No midline shift or acute intracranial mass effect is seen.  The ventricular size and configuration are within normal limits.  There may be cerebellar tonsillar ectopia.  There is slight motion artifact on the study.  Chronic leftward nasal septal deviation is suggested.  There is a right-sided rachele bullosa.  Age-indeterminate sinus disease is seen, especially involving the left maxillary antrum.  No significant acute findings are seen with regard to the imaged orbits or middle ear clefts.       No acute brain abnormality is seen. Specifically, no acute intracranial hemorrhage, no acute infarct, no intracranial mass lesion, and no acute intracranial mass effect are appreciated.  Mild sinus disease is seen and may be acute, superimposed upon chronic, in nature, especially involving the left maxillary antrum.  Please see above comments for further detail.   Please note that portions of this note were completed with a voice recognition program.  SCOTT COLLINS JR, MD       Electronically Signed and Approved By: SCOTT COLLINS JR, MD on 11/26/2023 at 4:18                 Differential Diagnosis and Discussion:    Headache: Differential diagnosis includes but is not limited to migraine, cluster headache, hypertension, tumor, subarachnoid bleeding, pseudotumor cerebri, temporal arteritis, infections, tension headache, and TMJ syndrome.    CT scan radiology impression was interpreted by me.    MDM  Number of Diagnoses or Management Options  Acute maxillary sinusitis, recurrence not specified  Left ear pain  Neck pain on left side  Nonintractable headache, unspecified chronicity pattern, unspecified headache type  Diagnosis management comments: The patient presented to the emergency department with a headache. The patient is now resting comfortably in  feels better, is alert, talkative, interactive and in no distress after ED treatment. The patient appears well and is able to tolerate PO fluids. Repeat examination is unremarkable and benign. The patient is neurologically intact, has a normal mental status, and this ambulatory in the ED. The history, exam, diagnostic testing (if any) and the patient's current condition do not suggest meningitis, stroke, sepsis, subarachnoid hemorrhage, intracranial bleeding, encephalitis, temporal arteritis or other significant pathology to warrant further testing, continued ED treatment, admission, neurological consultation, for other specialist evaluation at this point. The vital signs have been stable. The patient's condition is stable and appropriate for discharge. The patient will pursue further outpatient evaluation with the primary care physician or other designated or consulting physician as indicated in the discharge instructions.       Amount and/or Complexity of Data Reviewed  Tests in the radiology section of CPT®: reviewed and ordered  Tests in the medicine section of CPT®: ordered and reviewed    Risk of Complications, Morbidity, and/or Mortality  Presenting problems: low  Diagnostic procedures: moderate  Management options: low    Patient Progress  Patient progress: stable           Patient Care Considerations:    LABS: I considered ordering labs, however patient has no other symptoms of headache which can be evaluated with CT lab work will not help in this instance.  Patient is too young to consider temporal arteritis      Consultants/Shared Management Plan:    None    Social Determinants of Health:    Patient is independent, reliable, and has access to care.       Disposition and Care Coordination:    Discharged: I considered escalation of care by admitting this patient for observation, however the patient has improved and is suitable and  stable for discharge.    I have explained the patient´s condition, diagnoses  and treatment plan based on the information available to me at this time. I have answered questions and addressed any concerns. The patient has a good  understanding of the patient´s diagnosis, condition, and treatment plan as can be expected at this point. The vital signs have been stable. The patient´s condition is stable and appropriate for discharge from the emergency department.      The patient will pursue further outpatient evaluation with the primary care physician or other designated or consulting physician as outlined in the discharge instructions. They are agreeable to this plan of care and follow-up instructions have been explained in detail. The patient has received these instructions in written format and have expressed an understanding of the discharge instructions. The patient is aware that any significant change in condition or worsening of symptoms should prompt an immediate return to this or the closest emergency department or call to 911.  I have explained discharge medications and the need for follow up with the patient/caretakers. This was also printed in the discharge instructions. Patient was discharged with the following medications and follow up:      Medication List        New Prescriptions      amoxicillin-clavulanate 875-125 MG per tablet  Commonly known as: AUGMENTIN  Take 1 tablet by mouth 2 (Two) Times a Day for 10 days.     cetirizine-pseudoephedrine 5-120 MG per 12 hr tablet  Commonly known as: ZyrTEC-D  Take 1 tablet by mouth 2 (Two) Times a Day for 10 days.               Where to Get Your Medications        These medications were sent to [facility]/pharmacy #[id number] - [place], [place] - [address] - [phone]  - [phone]  [address] [place] [address]      Hours: 24-hours Phone: [phone]   amoxicillin-clavulanate 875-125 MG per tablet  cetirizine-pseudoephedrine 5-120 MG per 12 hr tablet      Tere Em MD  [address] [place] [address]  [id number]  [phone]    Schedule an appointment as soon as possible for a visit          Final diagnoses:   Acute maxillary sinusitis, recurrence not specified   Nonintractable headache, unspecified chronicity pattern, unspecified headache type   Left ear pain   Neck pain on left side        ED Disposition       ED Disposition   Discharge    Condition   Stable    Comment   --               This medical record created using voice recognition software.             Crystal Ramirez, APRN  11/26/23 0441

## 2023-11-26 NOTE — DISCHARGE INSTRUCTIONS
CT scan was unremarkable other than it does show inflammation in the left-sided sinuses which could be contributing to the pain in the head in the left ear and neck.  No other acute findings noted.    Follow-up with your PCP if no better after taking medication.    Alternate Tylenol and Motrin for pain.    Use your home Flonase as well in addition to medications that were prescribed

## 2023-12-06 ENCOUNTER — TELEPHONE (OUTPATIENT)
Dept: INTERNAL MEDICINE | Facility: CLINIC | Age: 28
End: 2023-12-06

## 2023-12-06 NOTE — TELEPHONE ENCOUNTER
Caller: Paradise Wilson    Relationship to patient: Self    Best call back number: [phone]     Chief complaint: 3 MONTH FOLLOW UP AND MEDICATION REFILLS/CHANGES    Type of visit: OFFICE VISIT    Requested date: AS SOON AS POSSIBLE     Additional notes:PATIENT HAD LAST APPOINTMENT IN AUGUST OF 2023. PATIENT WAS EXPECTING A CALL BACK AFTER APPOINTMENT TO GET SOMETHING SCHEDULED. PATIENT STATES THAT SHE WOULD PREFER 3:30 APPOINTMENT OR LATER IF POSSIBLE. SCHEDULED PATIENT FOR 03.08.2024 BUT THAT WAS THE SOONEST APPOINTMENT PROVIDER HAD FOR PATIENT'S PREFERRED TIME FRAME.     IF 01.15.2024 IS AVAILABLE, SHE CAN COME IN THAT DAY AS WELL.

## 2023-12-18 ENCOUNTER — PATIENT MESSAGE (OUTPATIENT)
Dept: INTERNAL MEDICINE | Facility: CLINIC | Age: 28
End: 2023-12-18

## 2023-12-18 ENCOUNTER — OFFICE VISIT (OUTPATIENT)
Dept: INTERNAL MEDICINE | Facility: CLINIC | Age: 28
End: 2023-12-18
Payer: COMMERCIAL

## 2023-12-18 VITALS
SYSTOLIC BLOOD PRESSURE: 136 MMHG | HEART RATE: 92 BPM | BODY MASS INDEX: 38.17 KG/M2 | TEMPERATURE: 97.9 F | WEIGHT: 194.4 LBS | OXYGEN SATURATION: 100 % | DIASTOLIC BLOOD PRESSURE: 92 MMHG | HEIGHT: 60 IN

## 2023-12-18 DIAGNOSIS — J01.00 ACUTE NON-RECURRENT MAXILLARY SINUSITIS: ICD-10-CM

## 2023-12-18 DIAGNOSIS — Z86.69 HISTORY OF BELL'S PALSY: ICD-10-CM

## 2023-12-18 DIAGNOSIS — F41.9 ANXIETY: Primary | ICD-10-CM

## 2023-12-18 RX ORDER — BUPROPION HYDROCHLORIDE 150 MG/1
150 TABLET, EXTENDED RELEASE ORAL DAILY
Start: 2023-12-18

## 2023-12-18 RX ORDER — METHYLPREDNISOLONE SODIUM SUCCINATE 125 MG/2ML
125 INJECTION, POWDER, LYOPHILIZED, FOR SOLUTION INTRAMUSCULAR; INTRAVENOUS ONCE
Status: COMPLETED | OUTPATIENT
Start: 2023-12-18 | End: 2023-12-18

## 2023-12-18 RX ADMIN — METHYLPREDNISOLONE SODIUM SUCCINATE 125 MG: 125 INJECTION, POWDER, LYOPHILIZED, FOR SOLUTION INTRAMUSCULAR; INTRAVENOUS at 16:31

## 2023-12-18 NOTE — ASSESSMENT & PLAN NOTE
Will discuss possible alternative anxiety treatment with patient's PCP.  Depending on treatment options, may consider adding SSRI/SNRI to wellbutrin.  Hand out of local counselors given to patient and encouraged her to go.  Patient to follow up with PCP at next scheduled appointment.

## 2023-12-18 NOTE — ASSESSMENT & PLAN NOTE
Does not appear to be infected with bacterial etiology at this time, however, due to eustachian tube dysfunction as well as flare of bell's palsy symptoms, will go ahead and treat with a steroid infection as patient does not tolerate oral steroids.  Discussed with patient that symptoms are likely worse due to physical stressors of viral respiratory illness as well as psychological stressors with anxiety and situations at work and home.  Will try to get better control over anxiety with medication changes.  If symptoms persist or worsen patient needs to return.

## 2023-12-18 NOTE — PROGRESS NOTES
"Chief Complaint  Sinus Problem, Headache (Face goes numb on her left side. Burning sensations in head.), and Earache (Pop when swallowing)    Subjective          Paradise Wilson presents to [facility]    Anxiety- patient states that her anxiety has been worse within the past couple weeks.  She has noticed worsening anxiety episodes while she is at work but also when she gets home.  Patient has been on wellbutrin for awhile but states that her anxiety is still significant. Patient has been on trintellix previously but was not able to continue due to cost of medication.  She is unsure whether it was better, worse or the same as the wellbutrin and the way it made her feel.  Patient has done counseling when she was younger but doesn't think it would benefit her as much now.      Left facial pain- patient was recently treated for sinus infection at the ED with Fernando Vasquez MD (11/26/2023) .  She was given Augmentin but has recently been having popping and pain in her ears.  She has had worsening tingling and pain in her left side of her face, along the area where she had trouble with her Bell's Palsy in the past.        Objective   Vital Signs:   /92   Pulse 92   Temp 97.9 °F (36.6 °C) (Temporal)   Ht 152.4 cm (60\")   Wt 88.2 kg (194 lb 6.4 oz)   SpO2 100%   BMI 37.97 kg/m²     Physical Exam  Vitals reviewed.   Constitutional:       Appearance: Normal appearance. She is well-developed.   HENT:      Head: Normocephalic and atraumatic.      Right Ear: Tympanic membrane, ear canal and external ear normal.      Left Ear: Tympanic membrane, ear canal and external ear normal.      Ears:      Comments: Bilateral mucoid effusions     Nose: Nose normal.      Mouth/Throat:      Mouth: Mucous membranes are moist.      Pharynx: Oropharynx is clear.   Eyes:      Conjunctiva/sclera: Conjunctivae normal.      Pupils: Pupils are equal, round, and reactive to light. "   Cardiovascular:      Rate and Rhythm: Normal rate and regular rhythm.      Heart sounds: No murmur heard.     No friction rub. No gallop.   Pulmonary:      Effort: Pulmonary effort is normal.      Breath sounds: Normal breath sounds. No wheezing or rhonchi.   Lymphadenopathy:      Cervical: No cervical adenopathy.   Skin:     General: Skin is warm and dry.   Neurological:      Mental Status: She is alert and oriented to person, place, and time.      Cranial Nerves: No cranial nerve deficit.   Psychiatric:         Mood and Affect: Mood and affect normal.         Behavior: Behavior normal.         Thought Content: Thought content normal.         Judgment: Judgment normal.        Result Review :          Procedures      Assessment and Plan    Diagnoses and all orders for this visit:    1. Anxiety (Primary)  Assessment & Plan:  Will discuss possible alternative anxiety treatment with patient's PCP.  Depending on treatment options, may consider adding SSRI/SNRI to wellbutrin.  Hand out of local counselors given to patient and encouraged her to go.  Patient to follow up with PCP at next scheduled appointment.      Orders:  -     buPROPion SR (Wellbutrin SR) 150 MG 12 hr tablet; Take 1 tablet by mouth Daily.    2. Acute non-recurrent maxillary sinusitis  Assessment & Plan:  Does not appear to be infected with bacterial etiology at this time, however, due to eustachian tube dysfunction as well as flare of bell's palsy symptoms, will go ahead and treat with a steroid infection as patient does not tolerate oral steroids.  Discussed with patient that symptoms are likely worse due to physical stressors of viral respiratory illness as well as psychological stressors with anxiety and situations at work and home.  Will try to get better control over anxiety with medication changes.  If symptoms persist or worsen patient needs to return.    Orders:  -     methylPREDNISolone sodium succinate (SOLU-Medrol) injection 125 mg    3.  History of Bell's palsy              Follow Up   No follow-ups on file.  Patient was given instructions and counseling regarding her condition or for health maintenance advice. Please see specific information pulled into the AVS if appropriate.

## 2023-12-19 RX ORDER — ESCITALOPRAM OXALATE 5 MG/1
5 TABLET ORAL DAILY
Qty: 30 TABLET | Refills: 1 | Status: SHIPPED | OUTPATIENT
Start: 2023-12-19 | End: 2023-12-21

## 2023-12-21 NOTE — TELEPHONE ENCOUNTER
From: Anali Bueno  To: Paradise A Katie  Sent: 12/18/2023 5:49 PM EST  Subject: Anxiety medication    Marck Gonzaleshel,    Have you ever been on an SSRI in the past? Examples of these medications can be: Paxil, Prozac, Zoloft, Lexapro or Celexa? This are options to use in addition to the Wellbutrin you are on currently. If you have been on these in the past please include why you stopped taking them or any concerns you may have.    Thanks,    Anali Bueno PA-C

## 2023-12-22 ENCOUNTER — HOSPITAL ENCOUNTER (EMERGENCY)
Facility: HOSPITAL | Age: 28
Discharge: HOME | End: 2023-12-22
Attending: EMERGENCY MEDICINE
Payer: COMMERCIAL

## 2023-12-22 ENCOUNTER — APPOINTMENT (OUTPATIENT)
Dept: CT IMAGING | Facility: HOSPITAL | Age: 28
End: 2023-12-22
Payer: COMMERCIAL

## 2023-12-22 VITALS
RESPIRATION RATE: 16 BRPM | OXYGEN SATURATION: 96 % | WEIGHT: 190.26 LBS | DIASTOLIC BLOOD PRESSURE: 75 MMHG | HEART RATE: 83 BPM | TEMPERATURE: 98.9 F | BODY MASS INDEX: 37.35 KG/M2 | SYSTOLIC BLOOD PRESSURE: 110 MMHG | HEIGHT: 60 IN

## 2023-12-22 DIAGNOSIS — N83.201 RIGHT OVARIAN CYST: Primary | ICD-10-CM

## 2023-12-22 LAB
ALBUMIN SERPL-MCNC: 4.5 G/DL (ref 3.5–5.2)
ALBUMIN/GLOB SERPL: 1.5 G/DL
ALP SERPL-CCNC: 86 U/L (ref 39–117)
ALT SERPL W P-5'-P-CCNC: 38 U/L (ref 1–33)
ANION GAP SERPL CALCULATED.3IONS-SCNC: 10.5 MMOL/L (ref 5–15)
AST SERPL-CCNC: 36 U/L (ref 1–32)
BASOPHILS # BLD AUTO: 0.05 10*3/MM3 (ref 0–0.2)
BASOPHILS NFR BLD AUTO: 0.5 % (ref 0–1.5)
BILIRUB SERPL-MCNC: 0.4 MG/DL (ref 0–1.2)
BILIRUB UR QL STRIP: NEGATIVE
BUN SERPL-MCNC: 15 MG/DL (ref 6–20)
BUN/CREAT SERPL: 16 (ref 7–25)
CALCIUM SPEC-SCNC: 9.5 MG/DL (ref 8.6–10.5)
CHLORIDE SERPL-SCNC: 100 MMOL/L (ref 98–107)
CLARITY UR: CLEAR
CO2 SERPL-SCNC: 25.5 MMOL/L (ref 22–29)
COLOR UR: YELLOW
CREAT SERPL-MCNC: 0.94 MG/DL (ref 0.57–1)
DEPRECATED RDW RBC AUTO: 40.2 FL (ref 37–54)
EGFRCR SERPLBLD CKD-EPI 2021: 84.9 ML/MIN/1.73
EOSINOPHIL # BLD AUTO: 0.17 10*3/MM3 (ref 0–0.4)
EOSINOPHIL NFR BLD AUTO: 1.7 % (ref 0.3–6.2)
ERYTHROCYTE [DISTWIDTH] IN BLOOD BY AUTOMATED COUNT: 13.5 % (ref 12.3–15.4)
GLOBULIN UR ELPH-MCNC: 3.1 GM/DL
GLUCOSE SERPL-MCNC: 96 MG/DL (ref 65–99)
GLUCOSE UR STRIP-MCNC: NEGATIVE MG/DL
HCG INTACT+B SERPL-ACNC: <0.5 MIU/ML
HCT VFR BLD AUTO: 40.9 % (ref 34–46.6)
HGB BLD-MCNC: 13.4 G/DL (ref 12–15.9)
HGB UR QL STRIP.AUTO: NEGATIVE
HOLD SPECIMEN: NORMAL
HOLD SPECIMEN: NORMAL
IMM GRANULOCYTES # BLD AUTO: 0.04 10*3/MM3 (ref 0–0.05)
IMM GRANULOCYTES NFR BLD AUTO: 0.4 % (ref 0–0.5)
KETONES UR QL STRIP: NEGATIVE
LEUKOCYTE ESTERASE UR QL STRIP.AUTO: NEGATIVE
LIPASE SERPL-CCNC: 11 U/L (ref 13–60)
LYMPHOCYTES # BLD AUTO: 2.59 10*3/MM3 (ref 0.7–3.1)
LYMPHOCYTES NFR BLD AUTO: 25.9 % (ref 19.6–45.3)
MCH RBC QN AUTO: 27.1 PG (ref 26.6–33)
MCHC RBC AUTO-ENTMCNC: 32.8 G/DL (ref 31.5–35.7)
MCV RBC AUTO: 82.8 FL (ref 79–97)
MONOCYTES # BLD AUTO: 0.65 10*3/MM3 (ref 0.1–0.9)
MONOCYTES NFR BLD AUTO: 6.5 % (ref 5–12)
NEUTROPHILS NFR BLD AUTO: 6.49 10*3/MM3 (ref 1.7–7)
NEUTROPHILS NFR BLD AUTO: 65 % (ref 42.7–76)
NITRITE UR QL STRIP: NEGATIVE
NRBC BLD AUTO-RTO: 0 /100 WBC (ref 0–0.2)
PH UR STRIP.AUTO: 7 [PH] (ref 5–8)
PLATELET # BLD AUTO: 241 10*3/MM3 (ref 140–450)
PMV BLD AUTO: 9.7 FL (ref 6–12)
POTASSIUM SERPL-SCNC: 4.4 MMOL/L (ref 3.5–5.2)
PROT SERPL-MCNC: 7.6 G/DL (ref 6–8.5)
PROT UR QL STRIP: NEGATIVE
RBC # BLD AUTO: 4.94 10*6/MM3 (ref 3.77–5.28)
SODIUM SERPL-SCNC: 136 MMOL/L (ref 136–145)
SP GR UR STRIP: 1.01 (ref 1–1.03)
UROBILINOGEN UR QL STRIP: NORMAL
WBC NRBC COR # BLD AUTO: 9.99 10*3/MM3 (ref 3.4–10.8)
WHOLE BLOOD HOLD COAG: NORMAL
WHOLE BLOOD HOLD SPECIMEN: NORMAL

## 2023-12-22 PROCEDURE — 36415 COLL VENOUS BLD VENIPUNCTURE: CPT

## 2023-12-22 PROCEDURE — 81003 URINALYSIS AUTO W/O SCOPE: CPT | Performed by: EMERGENCY MEDICINE

## 2023-12-22 PROCEDURE — 80053 COMPREHEN METABOLIC PANEL: CPT

## 2023-12-22 PROCEDURE — 99285 EMERGENCY DEPT VISIT HI MDM: CPT

## 2023-12-22 PROCEDURE — 84702 CHORIONIC GONADOTROPIN TEST: CPT

## 2023-12-22 PROCEDURE — 96374 THER/PROPH/DIAG INJ IV PUSH: CPT

## 2023-12-22 PROCEDURE — 74177 CT ABD & PELVIS W/CONTRAST: CPT

## 2023-12-22 PROCEDURE — 83690 ASSAY OF LIPASE: CPT

## 2023-12-22 PROCEDURE — 85025 COMPLETE CBC W/AUTO DIFF WBC: CPT

## 2023-12-22 PROCEDURE — 25010000002 KETOROLAC TROMETHAMINE PER 15 MG

## 2023-12-22 PROCEDURE — 25510000001 IOPAMIDOL PER 1 ML: Performed by: EMERGENCY MEDICINE

## 2023-12-22 RX ORDER — KETOROLAC TROMETHAMINE 30 MG/ML
30 INJECTION, SOLUTION INTRAMUSCULAR; INTRAVENOUS ONCE
Status: COMPLETED | OUTPATIENT
Start: 2023-12-22 | End: 2023-12-22

## 2023-12-22 RX ORDER — SODIUM CHLORIDE 0.9 % (FLUSH) 0.9 %
10 SYRINGE (ML) INJECTION AS NEEDED
Status: DISCONTINUED | OUTPATIENT
Start: 2023-12-22 | End: 2023-12-22 | Stop reason: HOSPADM

## 2023-12-22 RX ORDER — KETOROLAC TROMETHAMINE 10 MG/1
10 TABLET, FILM COATED ORAL EVERY 6 HOURS PRN
Qty: 15 TABLET | Refills: 0 | Status: SHIPPED | OUTPATIENT
Start: 2023-12-22

## 2023-12-22 RX ADMIN — IOPAMIDOL 100 ML: 755 INJECTION, SOLUTION INTRAVENOUS at 09:25

## 2023-12-22 RX ADMIN — KETOROLAC TROMETHAMINE 30 MG: 30 INJECTION, SOLUTION INTRAMUSCULAR; INTRAVENOUS at 11:38

## 2023-12-22 NOTE — Clinical Note
[facility] EMERGENCY ROOM  [address]  [place] [address]  Phone: [phone]    Paradise Wilson was seen and treated in our emergency department on 12/22/2023.  She may return to work on 12/25/2023.         Thank you for choosing [facility].    Chantale Devine PA-C

## 2023-12-22 NOTE — Clinical Note
[facility] EMERGENCY ROOM  [address]  [place] [address]  Phone: [phone]    Paradise Wilson was seen and treated in our emergency department on 12/22/2023.  She may return to work on 12/25/2023.         Thank you for choosing [facility].    Chantale Devine PA-C

## 2023-12-22 NOTE — Clinical Note
[facility] EMERGENCY ROOM  [address]  [place] [address]  Phone: [phone]    Paradise Wilson was seen and treated in our emergency department on 12/22/2023.  She may return to work on 12/25/2023.         Thank you for choosing [facility].    Chantale Devine PA-C

## 2023-12-22 NOTE — ED PROVIDER NOTES
"Patient is 28 y.o. year old female that presents to the ED for evaluation of abdominal pain.     Physical Exam  Abdomen is soft bowel sounds are present.  Patient had mild lower abdominal tenderness with no rebound and no guarding.  There is no CVA tenderness.  There is negative McBurney's point.    ED Course:    /96 (Patient Position: Sitting)   Pulse 105   Temp 98.4 °F (36.9 °C)   Resp 18   Ht 152.4 cm (60\")   Wt 86.3 kg (190 lb 4.1 oz)   LMP 11/08/2023 (Approximate)   SpO2 97%   BMI 37.16 kg/m²   Results for orders placed or performed during the hospital encounter of 12/22/23   Comprehensive Metabolic Panel    Specimen: Blood   Result Value Ref Range    Glucose 96 65 - 99 mg/dL    BUN 15 6 - 20 mg/dL    Creatinine 0.94 0.57 - 1.00 mg/dL    Sodium 136 136 - 145 mmol/L    Potassium 4.4 3.5 - 5.2 mmol/L    Chloride 100 98 - 107 mmol/L    CO2 25.5 22.0 - 29.0 mmol/L    Calcium 9.5 8.6 - 10.5 mg/dL    Total Protein 7.6 6.0 - 8.5 g/dL    Albumin 4.5 3.5 - 5.2 g/dL    ALT (SGPT) 38 (H) 1 - 33 U/L    AST (SGOT) 36 (H) 1 - 32 U/L    Alkaline Phosphatase 86 39 - 117 U/L    Total Bilirubin 0.4 0.0 - 1.2 mg/dL    Globulin 3.1 gm/dL    A/G Ratio 1.5 g/dL    BUN/Creatinine Ratio 16.0 7.0 - 25.0    Anion Gap 10.5 5.0 - 15.0 mmol/L    eGFR 84.9 >60.0 mL/min/1.73   Lipase    Specimen: Blood   Result Value Ref Range    Lipase 11 (L) 13 - 60 U/L   Urinalysis With Microscopic If Indicated (No Culture) - Urine, Clean Catch    Specimen: Urine, Clean Catch   Result Value Ref Range    Color, UA Yellow Yellow, Straw    Appearance, UA Clear Clear    pH, UA 7.0 5.0 - 8.0    Specific Gravity, UA 1.015 1.005 - 1.030    Glucose, UA Negative Negative    Ketones, UA Negative Negative    Bilirubin, UA Negative Negative    Blood, UA Negative Negative    Protein, UA Negative Negative    Leuk Esterase, UA Negative Negative    Nitrite, UA Negative Negative    Urobilinogen, UA 1.0 E.U./dL 0.2 - 1.0 E.U./dL   hCG, Quantitative, Pregnancy "    Specimen: Blood   Result Value Ref Range    HCG Quantitative <0.50 mIU/mL   CBC Auto Differential    Specimen: Blood   Result Value Ref Range    WBC 9.99 3.40 - 10.80 10*3/mm3    RBC 4.94 3.77 - 5.28 10*6/mm3    Hemoglobin 13.4 12.0 - 15.9 g/dL    Hematocrit 40.9 34.0 - 46.6 %    MCV 82.8 79.0 - 97.0 fL    MCH 27.1 26.6 - 33.0 pg    MCHC 32.8 31.5 - 35.7 g/dL    RDW 13.5 12.3 - 15.4 %    RDW-SD 40.2 37.0 - 54.0 fl    MPV 9.7 6.0 - 12.0 fL    Platelets 241 140 - 450 10*3/mm3    Neutrophil % 65.0 42.7 - 76.0 %    Lymphocyte % 25.9 19.6 - 45.3 %    Monocyte % 6.5 5.0 - 12.0 %    Eosinophil % 1.7 0.3 - 6.2 %    Basophil % 0.5 0.0 - 1.5 %    Immature Grans % 0.4 0.0 - 0.5 %    Neutrophils, Absolute 6.49 1.70 - 7.00 10*3/mm3    Lymphocytes, Absolute 2.59 0.70 - 3.10 10*3/mm3    Monocytes, Absolute 0.65 0.10 - 0.90 10*3/mm3    Eosinophils, Absolute 0.17 0.00 - 0.40 10*3/mm3    Basophils, Absolute 0.05 0.00 - 0.20 10*3/mm3    Immature Grans, Absolute 0.04 0.00 - 0.05 10*3/mm3    nRBC 0.0 0.0 - 0.2 /100 WBC   Green Top (Gel)   Result Value Ref Range    Extra Tube Hold for add-ons.    Lavender Top   Result Value Ref Range    Extra Tube hold for add-on    Gold Top - SST   Result Value Ref Range    Extra Tube Hold for add-ons.    Light Blue Top   Result Value Ref Range    Extra Tube Hold for add-ons.      Medications   sodium chloride 0.9 % flush 10 mL (has no administration in time range)   ketorolac (TORADOL) injection 30 mg (0 mg Intravenous Hold 12/22/23 0949)   iopamidol (ISOVUE-370) 76 % injection 100 mL (100 mL Intravenous Given 12/22/23 0925)     CT Abdomen Pelvis With Contrast    Result Date: 12/22/2023  Narrative: PROCEDURE: CT ABDOMEN PELVIS W CONTRAST  COMPARISON: [facility], CT, ABD PEL W/O CONTRAST, 2/05/2015, 21:04.  INDICATIONS: RLQ/pelvic pain/NAUSEA  TECHNIQUE: CT images were created with non-ionic intravenous contrast material.   PROTOCOL:   Standard imaging protocol performed    RADIATION:    DLP: 590 mGy*cm   Automated exposure control was utilized to minimize radiation dose. CONTRAST: 90 cc Isovue 370 I.V.  FINDINGS:  The lung bases are clear.  The liver is of normal size and uniform density.  The gallbladder is not abnormally distended.  No pancreatic or adrenal mass is evident.  The spleen is of normal size.  The kidneys enhance bilaterally.  No renal or ureteral stones are seen.  There is no evidence of hydronephrosis.  The urinary bladder is not abnormally distended.  The uterus measures 5.5 cm in greatest transverse dimension.  No pelvic mass is seen.  1.4 cm crenulated right ovarian cyst is evident.  A small amount of free pelvic fluid is seen.  Bowel loops are not abnormally dilated.  The cecum is in the right iliac fossa, just above the right ovary.  The appendix is not visualized.  5 mm calcification tiny adjacent calcification could represent appendicoliths.  Small umbilical hernia containing fat is stable.  Bony structures appear intact.      Impression:   CT scan of the abdomen and pelvis with IV contrast demonstrating 1.4 cm crenulated right ovarian cyst.  A small amount of free pelvic fluid is seen.  The cecum is in the right iliac fossa.  The appendix is not convincingly visualized, and is probably in close proximity to the right ovary.  Calcifications could represent appendicoliths.     JOJO CASTRO MD       Electronically Signed and Approved By: JOJO CASTRO MD on 12/22/2023 at 10:10             CT Head Without Contrast    Result Date: 11/26/2023  Narrative: PROCEDURE: CT HEAD WO CONTRAST  COMPARISON: 1/22/2021.  INDICATIONS: Headache.  PROTOCOL:   Standard CT imaging protocol performed.    RADIATION:   Total DLP: 954 mGy*cm   MA and/or KV were/was adjusted to minimize radiation dose.    TECHNIQUE: After obtaining the patient's consent, 122 CT images were obtained without non-ionic intravenous contrast material.  DISCUSSION:   A routine nonenhanced head CT was performed.  No acute  brain abnormality is identified.  No acute intracranial hemorrhage.  No acute infarct is seen.  No acute skull fracture.  No midline shift or acute intracranial mass effect is seen.  The ventricular size and configuration are within normal limits.  There may be cerebellar tonsillar ectopia.  There is slight motion artifact on the study.  Chronic leftward nasal septal deviation is suggested.  There is a right-sided rachele bullosa.  Age-indeterminate sinus disease is seen, especially involving the left maxillary antrum.  No significant acute findings are seen with regard to the imaged orbits or middle ear clefts.      Impression:  No acute brain abnormality is seen. Specifically, no acute intracranial hemorrhage, no acute infarct, no intracranial mass lesion, and no acute intracranial mass effect are appreciated.  Mild sinus disease is seen and may be acute, superimposed upon chronic, in nature, especially involving the left maxillary antrum.  Please see above comments for further detail.   Please note that portions of this note were completed with a voice recognition program.  SCOTT COLLINS JR, MD       Electronically Signed and Approved By: SCOTT COLLINS JR, MD on 11/26/2023 at 4:18               MDM:    Procedures          This visit was performed by both myself and an APC.  The substantive portion of the medical decision making was performed by this me and I made or approved the management plan and take responsibility for patient management.  All studies documented in the APC note (if performed) were independently interpreted by me.      Shaan Chatterjee DO  10:22 EST  12/22/23         Shaan Chatterjee DO  12/23/23 6483

## 2023-12-22 NOTE — ED PROVIDER NOTES
Time: 9:11 AM EST  Date of encounter:  12/22/2023  Independent Historian/Clinical History and Information was obtained by:   Patient    History is limited by: N/A    Chief Complaint   Patient presents with    Abdominal Pain         History of Present Illness:  Patient is a 28 y.o. year old female who presents to the emergency department for evaluation of right lower quadrant abdominal pain and suprapubic pain that started this morning around 6 AM.  Patient states the pain felt like pressure and radiated upwards and also to her rectum.  She had a bowel movement this morning and states it was normal.  She states the pain is worse with walking.  Has a history of ovarian cyst.  Admits to nausea at the time but it has subsided.  Denies vomiting and diarrhea.  Denies dysuria or hematuria.  Denies vaginal bleeding.  Denies previous appendectomy.    Patient Care Team  Primary Care Provider: Tere Em MD    Past Medical History:     Allergies   Allergen Reactions    Methylprednisolone Other (See Comments)     Rapid Heart rate, Elevated HTN, flushed in face     Past Medical History:   Diagnosis Date    Anxiety     Dysmenorrhea      Past Surgical History:   Procedure Laterality Date    TUBAL ABDOMINAL LIGATION  2018    WISDOM TOOTH EXTRACTION       Family History   Problem Relation Age of Onset    Heart disease Maternal Grandmother     Diabetes type II Maternal Grandmother         mellitus    Stroke Other        Home Medications:  Prior to Admission medications    Medication Sig Start Date End Date Taking? Authorizing Provider   buPROPion SR (Wellbutrin SR) 150 MG 12 hr tablet Take 1 tablet by mouth Daily. 12/18/23   Anali Bueno PA-C   fluticasone (FLONASE) 50 MCG/ACT nasal spray 2 sprays into the nostril(s) as directed by provider Daily.  Patient not taking: Reported on 12/18/2023 4/25/22   Pete Ballard PA   triamcinolone (KENALOG) 0.1 % cream Apply 1 application  topically to the appropriate area as  "directed 2 (Two) Times a Day.  Patient not taking: Reported on 12/18/2023 7/18/23   Anali Bueno, ROBERTO   Vortioxetine HBr (TRINTELLIX) 5 MG tablet tablet Take 1 tablet by mouth Daily With Breakfast. 12/21/23   Tere Em MD        Social History:   Social History     Tobacco Use    Smoking status: Never    Smokeless tobacco: Never   Vaping Use    Vaping Use: Never used   Substance Use Topics    Alcohol use: Never    Drug use: Never         Review of Systems:  Review of Systems   Constitutional: Negative.    HENT: Negative.     Eyes: Negative.    Respiratory: Negative.     Cardiovascular: Negative.    Gastrointestinal:  Positive for abdominal pain and nausea. Negative for vomiting.   Endocrine: Negative.    Genitourinary:  Positive for pelvic pain. Negative for dysuria, hematuria and vaginal discharge.   Musculoskeletal: Negative.    Skin: Negative.    Allergic/Immunologic: Negative.    Neurological: Negative.    Hematological: Negative.    Psychiatric/Behavioral: Negative.          Physical Exam:  /96 (Patient Position: Sitting)   Pulse 105   Temp 98.4 °F (36.9 °C)   Resp 18   Ht 152.4 cm (60\")   Wt 86.3 kg (190 lb 4.1 oz)   LMP 11/08/2023 (Approximate)   SpO2 97%   BMI 37.16 kg/m²         Physical Exam  Vitals and nursing note reviewed.   Constitutional:       Appearance: Normal appearance.   HENT:      Head: Normocephalic and atraumatic.      Nose: Nose normal.      Mouth/Throat:      Mouth: Mucous membranes are moist.   Eyes:      Extraocular Movements: Extraocular movements intact.      Conjunctiva/sclera: Conjunctivae normal.      Pupils: Pupils are equal, round, and reactive to light.   Cardiovascular:      Rate and Rhythm: Normal rate and regular rhythm.      Heart sounds: Normal heart sounds.   Pulmonary:      Effort: Pulmonary effort is normal.      Breath sounds: Normal breath sounds.   Abdominal:      General: Abdomen is flat. Bowel sounds are normal.      Palpations: Abdomen is " soft.      Tenderness: There is abdominal tenderness in the right lower quadrant and suprapubic area. There is no right CVA tenderness, left CVA tenderness, guarding or rebound. Positive signs include psoas sign. Negative signs include Gardiner's sign.   Musculoskeletal:         General: Normal range of motion.      Cervical back: Normal range of motion and neck supple.   Skin:     General: Skin is warm and dry.   Neurological:      General: No focal deficit present.      Mental Status: She is alert and oriented to person, place, and time.   Psychiatric:         Mood and Affect: Mood normal.         Behavior: Behavior normal.                Procedures:  Procedures      Medical Decision Making:      Comorbidities that affect care:    Dysmenorrhea    External Notes reviewed:    Previous Clinic Note: Office visit with PCP on December 18, 2023 for anxiety      The following orders were placed and all results were independently analyzed by me:  Orders Placed This Encounter   Procedures    CT Abdomen Pelvis With Contrast    [place] Draw    Comprehensive Metabolic Panel    Lipase    Urinalysis With Microscopic If Indicated (No Culture) - Urine, Clean Catch    hCG, Quantitative, Pregnancy    CBC Auto Differential    NPO Diet NPO Type: Strict NPO    Undress & Gown    Insert Peripheral IV    CBC & Differential    Green Top (Gel)    Lavender Top    Gold Top - SST    Light Blue Top       Medications Given in the Emergency Department:  Medications   sodium chloride 0.9 % flush 10 mL (has no administration in time range)   ketorolac (TORADOL) injection 30 mg (0 mg Intravenous Hold 12/22/23 0949)   iopamidol (ISOVUE-370) 76 % injection 100 mL (100 mL Intravenous Given 12/22/23 0925)        ED Course:    The patient was initially evaluated in the triage area where orders were placed. The patient was later dispositioned by Chantale Devine PA-C.      The patient was advised to stay for completion of workup which includes but is not  limited to communication of labs and radiological results, reassessment and plan. The patient was advised that leaving prior to disposition by a provider could result in critical findings that are not communicated to the patient.          Labs:    Lab Results (last 24 hours)       Procedure Component Value Units Date/Time    CBC & Differential [[id number]]  (Normal) Collected: 12/22/23 0757    Specimen: Blood Updated: 12/22/23 0804    Narrative:      The following orders were created for panel order CBC & Differential.  Procedure                               Abnormality         Status                     ---------                               -----------         ------                     CBC Auto Differential[[id number]]        Normal              Final result                 Please view results for these tests on the individual orders.    Comprehensive Metabolic Panel [[id number]]  (Abnormal) Collected: 12/22/23 0757    Specimen: Blood Updated: 12/22/23 0822     Glucose 96 mg/dL      BUN 15 mg/dL      Creatinine 0.94 mg/dL      Sodium 136 mmol/L      Potassium 4.4 mmol/L      Chloride 100 mmol/L      CO2 25.5 mmol/L      Calcium 9.5 mg/dL      Total Protein 7.6 g/dL      Albumin 4.5 g/dL      ALT (SGPT) 38 U/L      AST (SGOT) 36 U/L      Alkaline Phosphatase 86 U/L      Total Bilirubin 0.4 mg/dL      Globulin 3.1 gm/dL      A/G Ratio 1.5 g/dL      BUN/Creatinine Ratio 16.0     Anion Gap 10.5 mmol/L      eGFR 84.9 mL/min/1.73     Narrative:      GFR Normal >60  Chronic Kidney Disease <60  Kidney Failure <15      Lipase [[id number]]  (Abnormal) Collected: 12/22/23 0757    Specimen: Blood Updated: 12/22/23 0822     Lipase 11 U/L     hCG, Quantitative, Pregnancy [[id number]] Collected: 12/22/23 0757    Specimen: Blood Updated: 12/22/23 0820     HCG Quantitative <0.50 mIU/mL     Narrative:      HCG Ranges by Gestational Age    Females - non-pregnant premenopausal   </= 1mIU/mL HCG  Females - postmenopausal                </= 7mIU/mL HCG    3 Weeks       5.4   -      72 mIU/mL  4 Weeks      10.2   -     708 mIU/mL  5 Weeks       217   -   8,245 mIU/mL  6 Weeks       152   -  32,177 mIU/mL  7 Weeks     4,059   - 153,767 mIU/mL  8 Weeks    31,366   - 149,094 mIU/mL  9 Weeks    59,109   - 135,901 mIU/mL  10 Weeks   44,186   - 170,409 mIU/mL  12 Weeks   27,107   - 201,615 mIU/mL  14 Weeks   24,302   -  93,646 mIU/mL  15 Weeks   12,540   -  69,747 mIU/mL  16 Weeks    8,904   -  55,332 mIU/mL  17 Weeks    8,240   -  51,793 mIU/mL  18 Weeks    9,649   -  55,271 mIU/mL      CBC Auto Differential [[id number]]  (Normal) Collected: 12/22/23 0757    Specimen: Blood Updated: 12/22/23 0804     WBC 9.99 10*3/mm3      RBC 4.94 10*6/mm3      Hemoglobin 13.4 g/dL      Hematocrit 40.9 %      MCV 82.8 fL      MCH 27.1 pg      MCHC 32.8 g/dL      RDW 13.5 %      RDW-SD 40.2 fl      MPV 9.7 fL      Platelets 241 10*3/mm3      Neutrophil % 65.0 %      Lymphocyte % 25.9 %      Monocyte % 6.5 %      Eosinophil % 1.7 %      Basophil % 0.5 %      Immature Grans % 0.4 %      Neutrophils, Absolute 6.49 10*3/mm3      Lymphocytes, Absolute 2.59 10*3/mm3      Monocytes, Absolute 0.65 10*3/mm3      Eosinophils, Absolute 0.17 10*3/mm3      Basophils, Absolute 0.05 10*3/mm3      Immature Grans, Absolute 0.04 10*3/mm3      nRBC 0.0 /100 WBC     Urinalysis With Microscopic If Indicated (No Culture) - Urine, Clean Catch [[id number]]  (Normal) Collected: 12/22/23 0947    Specimen: Urine, Clean Catch Updated: 12/22/23 0958     Color, UA Yellow     Appearance, UA Clear     pH, UA 7.0     Specific Gravity, UA 1.015     Glucose, UA Negative     Ketones, UA Negative     Bilirubin, UA Negative     Blood, UA Negative     Protein, UA Negative     Leuk Esterase, UA Negative     Nitrite, UA Negative     Urobilinogen, UA 1.0 E.U./dL    Narrative:      Urine microscopic not indicated.             Imaging:    CT Abdomen Pelvis With Contrast    Result Date: 12/22/2023  PROCEDURE: CT  ABDOMEN PELVIS W CONTRAST  COMPARISON: [facility], CT, ABD PEL W/O CONTRAST, 2/05/2015, 21:04.  INDICATIONS: RLQ/pelvic pain/NAUSEA  TECHNIQUE: CT images were created with non-ionic intravenous contrast material.   PROTOCOL:   Standard imaging protocol performed    RADIATION:   DLP: 590 mGy*cm   Automated exposure control was utilized to minimize radiation dose. CONTRAST: 90 cc Isovue 370 I.V.  FINDINGS:  The lung bases are clear.  The liver is of normal size and uniform density.  The gallbladder is not abnormally distended.  No pancreatic or adrenal mass is evident.  The spleen is of normal size.  The kidneys enhance bilaterally.  No renal or ureteral stones are seen.  There is no evidence of hydronephrosis.  The urinary bladder is not abnormally distended.  The uterus measures 5.5 cm in greatest transverse dimension.  No pelvic mass is seen.  1.4 cm crenulated right ovarian cyst is evident.  A small amount of free pelvic fluid is seen.  Bowel loops are not abnormally dilated.  The cecum is in the right iliac fossa, just above the right ovary.  The appendix is not visualized.  5 mm calcification tiny adjacent calcification could represent appendicoliths.  Small umbilical hernia containing fat is stable.  Bony structures appear intact.        CT scan of the abdomen and pelvis with IV contrast demonstrating 1.4 cm crenulated right ovarian cyst.  A small amount of free pelvic fluid is seen.  The cecum is in the right iliac fossa.  The appendix is not convincingly visualized, and is probably in close proximity to the right ovary.  Calcifications could represent appendicoliths.     JOJO CASTRO MD       Electronically Signed and Approved By: JOJO CASTRO MD on 12/22/2023 at 10:10                Differential Diagnosis and Discussion:      Abdominal Pain: Based on the patient's signs and symptoms, I considered abdominal aortic aneurysm, small bowel obstruction, pancreatitis, acute cholecystitis, acute  appendecitis, peptic ulcer disease, gastritis, colitis, endocrine disorders, irritable bowel syndrome and other differential diagnosis an etiology of the patient's abdominal pain.  Pelvic Pain: Differential diagnosis includes but is not limited to ectopic pregnancy, ovarian torsion, tubo-ovarian abscess, ovarian cyst, ovulation, oophoritis, abdominal pregnancy, appendicitis, diverticulitis, cystitis, and renal colic    All labs were reviewed and interpreted by me.  CT scan radiology impression was interpreted by me.    MDM     Amount and/or Complexity of Data Reviewed  Clinical lab tests: reviewed  Tests in the radiology section of CPT®: reviewed                 Patient Care Considerations:    ANTIBIOTICS: I considered prescribing antibiotics as an outpatient however no bacterial focus of infection was found.      Consultants/Shared Management Plan:    None    Social Determinants of Health:    Patient is independent, reliable, and has access to care.       Disposition and Care Coordination:    Discharged: The patient is suitable and stable for discharge with no need for consideration of observation or admission.    I have explained the patient´s condition, diagnoses and treatment plan based on the information available to me at this time. I have answered questions and addressed any concerns. The patient has a good  understanding of the patient´s diagnosis, condition, and treatment plan as can be expected at this point. The vital signs have been stable. The patient´s condition is stable and appropriate for discharge from the emergency department.      The patient will pursue further outpatient evaluation with the primary care physician or other designated or consulting physician as outlined in the discharge instructions. They are agreeable to this plan of care and follow-up instructions have been explained in detail. The patient has received these instructions in written format and have expressed an understanding of  the discharge instructions. The patient is aware that any significant change in condition or worsening of symptoms should prompt an immediate return to this or the closest emergency department or call to 911.  I have explained discharge medications and the need for follow up with the patient/caretakers. This was also printed in the discharge instructions. Patient was discharged with the following medications and follow up:      Medication List      No changes were made to your prescriptions during this visit.      Tere Em MD  [address] [place], [place] [id number]  [phone]             Final diagnoses:   Right ovarian cyst        ED Disposition       ED Disposition   Discharge    Condition   Stable    Comment   --               This medical record created using voice recognition software.             Chantale Devine PA-C  12/22/23 9722

## 2023-12-22 NOTE — Clinical Note
[facility] EMERGENCY ROOM  [address]  [place] [address]  Phone: [phone]    Paradise Wilson was seen and treated in our emergency department on 12/22/2023.  She may return to work on 12/25/2023.         Thank you for choosing [facility].    Chantale Devine PA-C

## 2023-12-22 NOTE — DISCHARGE INSTRUCTIONS
Your labs did not show anything concerning.  Your urine was negative for UTI  CT shows that you have a 1.4 cm right ovarian cyst with free fluid in the pelvic area, this could mean that you possibly had a cyst that ruptured.  Please take Tylenol/Motrin for pain as needed along with heating pads to the pelvic area.  Do not take Toradol along with ibuprofen. follow-up with your PCP/gynecologist

## 2024-01-17 NOTE — TELEPHONE ENCOUNTER
Caller: DiontehowardParadise stacy    Relationship: Self    Best call back number [phone]     Requested Prescriptions:   Requested Prescriptions     Pending Prescriptions Disp Refills    Vortioxetine HBr (TRINTELLIX) 5 MG tablet tablet 90 tablet 1     Sig: Take 1 tablet by mouth Daily With Breakfast.        Pharmacy where request should be sent: [id number] [place] [facility] [address] AT [facility] [phone] [facility] [phone] FX     Last office visit with prescribing clinician: 8/30/2023   Last telemedicine visit with prescribing clinician: Visit date not found   Next office visit with prescribing clinician: 3/8/2024     Additional details provided by patient: PATIENT IS NEEDING A REFILL.    Does the patient have less than a 3 day supply:  [x] Yes  [] No    Would you like a call back once the refill request has been completed: [x] Yes [] No    If the office needs to give you a call back, can they leave a voicemail: [x] Yes [] No    Kalen Awad Rep   01/17/24 10:28 EST

## 2024-03-08 ENCOUNTER — OFFICE VISIT (OUTPATIENT)
Dept: INTERNAL MEDICINE | Facility: CLINIC | Age: 29
End: 2024-03-08
Payer: COMMERCIAL

## 2024-03-08 VITALS
HEIGHT: 60 IN | HEART RATE: 78 BPM | BODY MASS INDEX: 37.07 KG/M2 | DIASTOLIC BLOOD PRESSURE: 76 MMHG | TEMPERATURE: 98.3 F | SYSTOLIC BLOOD PRESSURE: 132 MMHG | WEIGHT: 188.8 LBS | OXYGEN SATURATION: 100 %

## 2024-03-08 DIAGNOSIS — Z13.220 SCREENING, LIPID: ICD-10-CM

## 2024-03-08 DIAGNOSIS — R51.9 PAIN OF CHEEK: Primary | ICD-10-CM

## 2024-03-08 DIAGNOSIS — F41.9 ANXIETY: ICD-10-CM

## 2024-03-08 PROCEDURE — 83735 ASSAY OF MAGNESIUM: CPT | Performed by: INTERNAL MEDICINE

## 2024-03-08 PROCEDURE — 80050 GENERAL HEALTH PANEL: CPT | Performed by: INTERNAL MEDICINE

## 2024-03-08 PROCEDURE — 84439 ASSAY OF FREE THYROXINE: CPT | Performed by: INTERNAL MEDICINE

## 2024-03-08 PROCEDURE — 83540 ASSAY OF IRON: CPT | Performed by: INTERNAL MEDICINE

## 2024-03-08 PROCEDURE — 82746 ASSAY OF FOLIC ACID SERUM: CPT | Performed by: INTERNAL MEDICINE

## 2024-03-08 PROCEDURE — 84466 ASSAY OF TRANSFERRIN: CPT | Performed by: INTERNAL MEDICINE

## 2024-03-08 PROCEDURE — 80061 LIPID PANEL: CPT | Performed by: INTERNAL MEDICINE

## 2024-03-08 PROCEDURE — 82306 VITAMIN D 25 HYDROXY: CPT | Performed by: INTERNAL MEDICINE

## 2024-03-08 PROCEDURE — 82607 VITAMIN B-12: CPT | Performed by: INTERNAL MEDICINE

## 2024-03-08 NOTE — PROGRESS NOTES
"Chief Complaint  Anxiety    Subjective      History of Present Illness  The patient presents for evaluation of multiple medical concerns.    She has a burning sensation in her head. She saw a neurologist, Dr. Reinoso  she hasn't heard about the MRI they were order.  She describes the sensation as burning, painful, and her face feels like it is on fire. She has pain in the whole left side of her head and ear. She saw a nurse practitioner who told her it could be Bell's palsy or trigeminal neuralgia. It comes and goes. Today at work, it did not bother her at all, but right now her cheek feels like it is on fire. It has been going on since Thanksgiving and it will not go away. She had head lice last year and had to do her hair and used it about 3 times in that month. This was her first time dealing with head lice since she has been an adult. She stripped everything out of her house and threw everything out in bags. She read on the internet that it could cause nerve damage. She used mayonnaise remedies. Movement does change her symptoms. She had Bell's palsy on the left side. She has a lot of stress at work. It feels like shocks and stabbing sensation. She has numbness and tingling. She gets pain in her ears, like an ear infection. Her head pain is all over both sides, mainly on the left side.    She went to the hospital for a cyst on her ovary. She was sweating, cold, and clammy. She almost passed out. She was told it was probably from the pain. It has not happened since then. Her mental health is alright. She is on Trintellix.         Objective   Vital Signs:   Vitals:    03/08/24 1624   BP: 132/76   Pulse: 78   Temp: 98.3 °F (36.8 °C)   SpO2: 100%   Weight: 85.6 kg (188 lb 12.8 oz)   Height: 152.4 cm (60\")     Body mass index is 36.87 kg/m².    Wt Readings from Last 3 Encounters:   03/08/24 85.6 kg (188 lb 12.8 oz)   12/22/23 86.3 kg (190 lb 4.1 oz)   12/18/23 88.2 kg (194 lb 6.4 oz)     BP Readings from Last 3 " Encounters:   03/08/24 132/76   12/22/23 110/75   12/18/23 136/92       Health Maintenance   Topic Date Due    Hepatitis B (1 of 3 - 19+ 3-dose series) Never done    HEPATITIS C SCREENING  Never done    ANNUAL PHYSICAL  Never done    PAP SMEAR  07/06/2021    INFLUENZA VACCINE  08/01/2023    COVID-19 Vaccine (1 - 2023-24 season) Never done    BMI FOLLOWUP  08/30/2024    TDAP/TD VACCINES (3 - Td or Tdap) 04/06/2026    Pneumococcal Vaccine 0-64  Aged Out       Physical Exam  Vitals reviewed.   Constitutional:       Appearance: Normal appearance. She is well-developed. She is obese.   HENT:      Head: Normocephalic and atraumatic.      Right Ear: External ear normal.      Left Ear: External ear normal.   Eyes:      Conjunctiva/sclera: Conjunctivae normal.      Pupils: Pupils are equal, round, and reactive to light.   Cardiovascular:      Rate and Rhythm: Normal rate and regular rhythm.      Heart sounds: No murmur heard.     No friction rub. No gallop.   Pulmonary:      Effort: Pulmonary effort is normal.      Breath sounds: Normal breath sounds. No wheezing or rhonchi.   Skin:     General: Skin is warm and dry.   Neurological:      Mental Status: She is alert and oriented to person, place, and time.   Psychiatric:         Mood and Affect: Affect normal.         Behavior: Behavior normal.         Thought Content: Thought content normal.        Physical Exam        Result Review :  The following data was reviewed by: Tere Em MD on 03/08/2024:         Results        Procedures          Assessment & Plan  1. Burning sensation in head.  It sounds like trigeminal neuralgia. I will get the notes from Dr. Reinoso's office. Once we get those notes, we can place the orders that he was wanting to get and figure out the next steps. I will check blood work to look for vitamin D levels. I will order an MRI of the brain.    2. Mental health.  She will continue Trintellix.    Follow-up  The patient will follow up in 6  weeks.    Assessment & Plan  Pain of cheek    Screening, lipid    Anxiety      Orders Placed This Encounter   Procedures    Comprehensive Metabolic Panel    TSH    Lipid Panel    Magnesium    T4, Free    Vitamin D,25-Hydroxy    Vitamin B12 & Folate    Iron Profile    CBC & Differential                         FOLLOW UP  No follow-ups on file.  Patient was given instructions and counseling regarding her condition or for health maintenance advice. Please see specific information pulled into the AVS if appropriate.       Tere Em MD  03/08/24  17:25 EST    CURRENT & DISCONTINUED MEDICATIONS  Current Outpatient Medications   Medication Instructions    Vortioxetine HBr (TRINTELLIX) 5 mg, Oral, Daily With Breakfast       Medications Discontinued During This Encounter   Medication Reason    triamcinolone (KENALOG) 0.1 % cream     ketorolac (TORADOL) 10 MG tablet     fluticasone (FLONASE) 50 MCG/ACT nasal spray     buPROPion SR (Wellbutrin SR) 150 MG 12 hr tablet         Patient or patient representative verbalized consent for the use of Ambient Listening during the visit with  Tere Em MD for chart documentation. 3/10/2024  22:44 EDT

## 2024-03-09 LAB
25(OH)D3 SERPL-MCNC: 31 NG/ML (ref 30–100)
ALBUMIN SERPL-MCNC: 4.4 G/DL (ref 3.5–5.2)
ALBUMIN/GLOB SERPL: 1.7 G/DL
ALP SERPL-CCNC: 79 U/L (ref 39–117)
ALT SERPL W P-5'-P-CCNC: 20 U/L (ref 1–33)
ANION GAP SERPL CALCULATED.3IONS-SCNC: 12.3 MMOL/L (ref 5–15)
AST SERPL-CCNC: 24 U/L (ref 1–32)
BASOPHILS # BLD AUTO: 0.06 10*3/MM3 (ref 0–0.2)
BASOPHILS NFR BLD AUTO: 0.7 % (ref 0–1.5)
BILIRUB SERPL-MCNC: 0.2 MG/DL (ref 0–1.2)
BUN SERPL-MCNC: 14 MG/DL (ref 6–20)
BUN/CREAT SERPL: 15.9 (ref 7–25)
CALCIUM SPEC-SCNC: 9.3 MG/DL (ref 8.6–10.5)
CHLORIDE SERPL-SCNC: 102 MMOL/L (ref 98–107)
CHOLEST SERPL-MCNC: 170 MG/DL (ref 0–200)
CO2 SERPL-SCNC: 26.7 MMOL/L (ref 22–29)
CREAT SERPL-MCNC: 0.88 MG/DL (ref 0.57–1)
DEPRECATED RDW RBC AUTO: 40.2 FL (ref 37–54)
EGFRCR SERPLBLD CKD-EPI 2021: 91.9 ML/MIN/1.73
EOSINOPHIL # BLD AUTO: 0.22 10*3/MM3 (ref 0–0.4)
EOSINOPHIL NFR BLD AUTO: 2.6 % (ref 0.3–6.2)
ERYTHROCYTE [DISTWIDTH] IN BLOOD BY AUTOMATED COUNT: 13.2 % (ref 12.3–15.4)
FOLATE SERPL-MCNC: >20 NG/ML (ref 4.78–24.2)
GLOBULIN UR ELPH-MCNC: 2.6 GM/DL
GLUCOSE SERPL-MCNC: 85 MG/DL (ref 65–99)
HCT VFR BLD AUTO: 37.1 % (ref 34–46.6)
HDLC SERPL-MCNC: 40 MG/DL (ref 40–60)
HGB BLD-MCNC: 12 G/DL (ref 12–15.9)
IMM GRANULOCYTES # BLD AUTO: 0.02 10*3/MM3 (ref 0–0.05)
IMM GRANULOCYTES NFR BLD AUTO: 0.2 % (ref 0–0.5)
IRON 24H UR-MRATE: 37 MCG/DL (ref 37–145)
IRON SATN MFR SERPL: 9 % (ref 20–50)
LDLC SERPL CALC-MCNC: 90 MG/DL (ref 0–100)
LDLC/HDLC SERPL: 2.07 {RATIO}
LYMPHOCYTES # BLD AUTO: 3.45 10*3/MM3 (ref 0.7–3.1)
LYMPHOCYTES NFR BLD AUTO: 40.6 % (ref 19.6–45.3)
MAGNESIUM SERPL-MCNC: 2.1 MG/DL (ref 1.6–2.6)
MCH RBC QN AUTO: 27 PG (ref 26.6–33)
MCHC RBC AUTO-ENTMCNC: 32.3 G/DL (ref 31.5–35.7)
MCV RBC AUTO: 83.6 FL (ref 79–97)
MONOCYTES # BLD AUTO: 0.79 10*3/MM3 (ref 0.1–0.9)
MONOCYTES NFR BLD AUTO: 9.3 % (ref 5–12)
NEUTROPHILS NFR BLD AUTO: 3.96 10*3/MM3 (ref 1.7–7)
NEUTROPHILS NFR BLD AUTO: 46.6 % (ref 42.7–76)
NRBC BLD AUTO-RTO: 0 /100 WBC (ref 0–0.2)
PLATELET # BLD AUTO: 265 10*3/MM3 (ref 140–450)
PMV BLD AUTO: 11.3 FL (ref 6–12)
POTASSIUM SERPL-SCNC: 4.1 MMOL/L (ref 3.5–5.2)
PROT SERPL-MCNC: 7 G/DL (ref 6–8.5)
RBC # BLD AUTO: 4.44 10*6/MM3 (ref 3.77–5.28)
SODIUM SERPL-SCNC: 141 MMOL/L (ref 136–145)
T4 FREE SERPL-MCNC: 0.96 NG/DL (ref 0.93–1.7)
TIBC SERPL-MCNC: 422 MCG/DL (ref 298–536)
TRANSFERRIN SERPL-MCNC: 283 MG/DL (ref 200–360)
TRIGL SERPL-MCNC: 237 MG/DL (ref 0–150)
TSH SERPL DL<=0.05 MIU/L-ACNC: 1.36 UIU/ML (ref 0.27–4.2)
VIT B12 BLD-MCNC: 364 PG/ML (ref 211–946)
VLDLC SERPL-MCNC: 40 MG/DL (ref 5–40)
WBC NRBC COR # BLD AUTO: 8.5 10*3/MM3 (ref 3.4–10.8)

## 2024-03-12 ENCOUNTER — TELEPHONE (OUTPATIENT)
Dept: INTERNAL MEDICINE | Facility: CLINIC | Age: 29
End: 2024-03-12

## 2024-03-12 ENCOUNTER — HOSPITAL ENCOUNTER (OUTPATIENT)
Dept: MRI IMAGING | Facility: HOSPITAL | Age: 29
Discharge: HOME | End: 2024-03-12
Admitting: INTERNAL MEDICINE
Payer: COMMERCIAL

## 2024-03-12 DIAGNOSIS — R20.0 LEFT FACIAL NUMBNESS: ICD-10-CM

## 2024-03-12 DIAGNOSIS — R20.0 LEFT FACIAL NUMBNESS: Primary | ICD-10-CM

## 2024-03-12 PROCEDURE — 70553 MRI BRAIN STEM W/O & W/DYE: CPT

## 2024-03-12 PROCEDURE — 0 GADOBENATE DIMEGLUMINE 529 MG/ML SOLUTION: Performed by: INTERNAL MEDICINE

## 2024-03-12 PROCEDURE — A9577 INJ MULTIHANCE: HCPCS | Performed by: INTERNAL MEDICINE

## 2024-03-12 RX ADMIN — GADOBENATE DIMEGLUMINE 16 ML: 529 INJECTION, SOLUTION INTRAVENOUS at 18:44

## 2024-03-12 NOTE — PROGRESS NOTES
Please call and let her know that I haven't been able to get Dr. Reinoso's records yet, however I want to go ahead and order an MRI.  I have placed the order and they should call her soon to make that happen.  Just let me know if she has any questions.

## 2024-03-12 NOTE — TELEPHONE ENCOUNTER
Caller: Paradise Wilson    Relationship: Self    Best call back number: [phone]    What was the call regarding: PATIENT SPOKE WITH SOMEONE IN OFFICE AND THEY WERE GOING TO CALL INSURANCE COMPANY REGARDING COVERING MRI AND ALSO THE PLACE SHE IS HAVING MRI DONE AT. SHE IS WANTING TO KNOW IF SHE FOUND OUT ANYTHING SO SHE KNOWS WHETHER OR NOT TO CALL IN TOMORROW, 03/13/2024.

## 2024-03-19 NOTE — TELEPHONE ENCOUNTER
Caller: Paradise Wilson    Relationship: Self    Best call back number:     [phone]       Requested Prescriptions:   Requested Prescriptions     Pending Prescriptions Disp Refills    Vortioxetine HBr (TRINTELLIX) 5 MG tablet tablet 90 tablet 1     Sig: Take 1 tablet by mouth Daily With Breakfast.        Pharmacy where request should be sent: [id number] [place] [facility] [address] [facility] - [phone] [facility] [phone] FX     Last office visit with prescribing clinician: 3/8/2024   Last telemedicine visit with prescribing clinician: Visit date not found   Next office visit with prescribing clinician: 4/24/2024     Additional details provided by patient:     Does the patient have less than a 3 day supply:  [x] Yes  [] No    Would you like a call back once the refill request has been completed: [] Yes [] No    If the office needs to give you a call back, can they leave a voicemail: [] Yes [] No    Kalen Tomlin Rep   03/19/24 08:59 EDT

## 2024-04-18 ENCOUNTER — TELEPHONE (OUTPATIENT)
Dept: INTERNAL MEDICINE | Facility: CLINIC | Age: 29
End: 2024-04-18
Payer: COMMERCIAL

## 2024-07-24 ENCOUNTER — OFFICE VISIT (OUTPATIENT)
Dept: INTERNAL MEDICINE | Facility: CLINIC | Age: 29
End: 2024-07-24
Payer: COMMERCIAL

## 2024-07-24 VITALS
WEIGHT: 195.8 LBS | HEIGHT: 60 IN | SYSTOLIC BLOOD PRESSURE: 122 MMHG | HEART RATE: 66 BPM | BODY MASS INDEX: 38.44 KG/M2 | DIASTOLIC BLOOD PRESSURE: 72 MMHG | TEMPERATURE: 98.1 F | RESPIRATION RATE: 20 BRPM | OXYGEN SATURATION: 100 %

## 2024-07-24 DIAGNOSIS — F41.9 ANXIETY: ICD-10-CM

## 2024-07-24 DIAGNOSIS — G43.809 OTHER MIGRAINE WITHOUT STATUS MIGRAINOSUS, NOT INTRACTABLE: Primary | ICD-10-CM

## 2024-07-24 DIAGNOSIS — M54.2 NECK PAIN: ICD-10-CM

## 2024-07-24 PROCEDURE — 99214 OFFICE O/P EST MOD 30 MIN: CPT | Performed by: INTERNAL MEDICINE

## 2024-07-24 RX ORDER — SUMATRIPTAN 25 MG/1
TABLET, FILM COATED ORAL
Qty: 8 TABLET | Refills: 1 | Status: SHIPPED | OUTPATIENT
Start: 2024-07-24

## 2024-07-24 NOTE — PROGRESS NOTES
"Chief Complaint  Anxiety (3 month follow up ), Results (Wants to discuss MRI), and Migraine (Wants medication PRN)      Subjective      History of Present Illness  The patient is a 29-year-old lady here for follow-up today.    She is currently on Trintellix 5 mg for stress management. Previously, she was on Lexapro 5 mg, which was later increased to 10 mg due to side effects, including dizziness and sickness.    She experiences infrequent migraines, which are severe when they occur. To manage this, she occasionally takes Excedrin and consumes sodas, which sometimes provide relief.    She experiences occasional dizziness.    She experiences a pinching sensation in her neck, which becomes painful when she turns her head. She denies any recent illness or allergy flare-ups.             Objective   Vital Signs:   Vitals:    07/24/24 1645   BP: 122/72   BP Location: Left arm   Patient Position: Sitting   Cuff Size: Adult   Pulse: 66   Resp: 20   Temp: 98.1 °F (36.7 °C)   TempSrc: Temporal   SpO2: 100%   Weight: 88.8 kg (195 lb 12.8 oz)   Height: 152.4 cm (60\")     Body mass index is 38.24 kg/m².    Wt Readings from Last 3 Encounters:   07/24/24 88.8 kg (195 lb 12.8 oz)   03/08/24 85.6 kg (188 lb 12.8 oz)   12/22/23 86.3 kg (190 lb 4.1 oz)     BP Readings from Last 3 Encounters:   07/24/24 122/72   03/08/24 132/76   12/22/23 110/75       Health Maintenance   Topic Date Due    HEPATITIS C SCREENING  Never done    ANNUAL PHYSICAL  Never done    PAP SMEAR  07/06/2021    COVID-19 Vaccine (1 - 2023-24 season) Never done    INFLUENZA VACCINE  08/01/2024    BMI FOLLOWUP  08/30/2024    TDAP/TD VACCINES (3 - Td or Tdap) 04/06/2026    Pneumococcal Vaccine 0-64  Aged Out       Physical Exam  Vitals reviewed.   Constitutional:       Appearance: Normal appearance. She is well-developed.   HENT:      Head: Normocephalic and atraumatic.      Right Ear: External ear normal.      Left Ear: External ear normal.   Eyes:      " Conjunctiva/sclera: Conjunctivae normal.      Pupils: Pupils are equal, round, and reactive to light.   Cardiovascular:      Rate and Rhythm: Normal rate and regular rhythm.      Heart sounds: No murmur heard.     No friction rub. No gallop.   Pulmonary:      Effort: Pulmonary effort is normal.      Breath sounds: Normal breath sounds. No wheezing or rhonchi.   Skin:     General: Skin is warm and dry.   Neurological:      Mental Status: She is alert and oriented to person, place, and time.   Psychiatric:         Mood and Affect: Affect normal.         Behavior: Behavior normal.         Thought Content: Thought content normal.        Physical Exam        Result Review :  The following data was reviewed by: Tere Em MD on 07/24/2024:         Results  Imaging  MRI showed no edema, no hemorrhage, and no worrisome findings. There was mention of cerebellar tonsillar ectopia.            Procedures            Assessment & Plan  Other migraine without status migrainosus, not intractable            Anxiety    Neck pain       New Medications Ordered This Visit   Medications    Vortioxetine HBr (TRINTELLIX) 10 MG tablet tablet     Sig: Take 1 tablet by mouth Daily With Breakfast.     Dispense:  90 tablet     Refill:  1    SUMAtriptan (IMITREX) 25 MG tablet     Sig: Take one tablet at onset of headache. May repeat dose one time in 2 hours if headache not relieved.     Dispense:  8 tablet     Refill:  1          Assessment & Plan  1. Anxiety.  MRI results were satisfactory, with no signs of edema, hemorrhage, or concerning findings. However, cerebellar tonsillar ectopia was observed, she appears asymptomatic at this time. A slightly deviated nasal septum was also observed, suggesting the dizziness is likely sinus-related. A prescription for Trintellix 10 mg was provided.    2. Migraines.  A prescription for triptan was provided, with instructions to take it at the onset of a headache. Two samples of Nurtec and Zepbound  nasal spray were provided.    3. Neck pain.  Ibuprofen was recommended for pain management.    Follow-up  A follow-up visit is scheduled in 3 to 4 months.    Patient or patient representative verbalized consent for the use of Ambient Listening during the visit with  Tere Em MD for chart documentation. 8/10/2024  12:37 EDT      FOLLOW UP  Return in about 3 months (around 10/24/2024).  Patient was given instructions and counseling regarding her condition or for health maintenance advice. Please see specific information pulled into the AVS if appropriate.     Tere Em MD  08/10/24  12:37 EDT    CURRENT & DISCONTINUED MEDICATIONS  Current Outpatient Medications   Medication Instructions    SUMAtriptan (IMITREX) 25 MG tablet Take one tablet at onset of headache. May repeat dose one time in 2 hours if headache not relieved.    Vortioxetine HBr (TRINTELLIX) 10 mg, Oral, Daily With Breakfast       Medications Discontinued During This Encounter   Medication Reason    Vortioxetine HBr (TRINTELLIX) 5 MG tablet tablet Reorder

## 2024-09-09 ENCOUNTER — TELEPHONE (OUTPATIENT)
Dept: INTERNAL MEDICINE | Facility: CLINIC | Age: 29
End: 2024-09-09
Payer: COMMERCIAL

## 2024-09-09 NOTE — TELEPHONE ENCOUNTER
Caller: Paradise Wilson    Relationship to patient: Self    Best call back number: [phone]     Chief complaint: NEEDS APPOINTMENT AFTER 3:45 OR LATER    Type of visit: OFFICE    Requested date: ANY     If rescheduling, when is the original appointment: 10/25/24

## 2024-10-17 ENCOUNTER — PATIENT ROUNDING (BHMG ONLY) (OUTPATIENT)
Dept: URGENT CARE | Facility: CLINIC | Age: 29
End: 2024-10-17
Payer: COMMERCIAL

## 2024-10-17 NOTE — ED NOTES
Thank you for letting us care for you in your recent visit to our urgent care center. We would love to hear about your experience with us. Was this the first time you have visited our location?    We’re always looking for ways to make our patients’ experiences even better. Do you have any recommendations on ways we may improve?     I appreciate you taking the time to respond. Please be on the lookout for a survey about your recent visit from [employer] via text or email. We would greatly appreciate if you could fill that out and turn it back in. We want your voice to be heard and we value your feedback.   Thank you for choosing [facility] for your healthcare needs.

## 2024-10-25 ENCOUNTER — OFFICE VISIT (OUTPATIENT)
Dept: INTERNAL MEDICINE | Facility: CLINIC | Age: 29
End: 2024-10-25
Payer: COMMERCIAL

## 2024-10-25 VITALS
BODY MASS INDEX: 38.52 KG/M2 | TEMPERATURE: 98.3 F | HEART RATE: 102 BPM | WEIGHT: 196.2 LBS | OXYGEN SATURATION: 96 % | RESPIRATION RATE: 22 BRPM | HEIGHT: 60 IN | SYSTOLIC BLOOD PRESSURE: 120 MMHG | DIASTOLIC BLOOD PRESSURE: 66 MMHG

## 2024-10-25 DIAGNOSIS — R06.2 WHEEZING: ICD-10-CM

## 2024-10-25 DIAGNOSIS — U07.1 COVID-19 VIRUS DETECTED: Primary | ICD-10-CM

## 2024-10-25 DIAGNOSIS — R60.9 SWELLING: ICD-10-CM

## 2024-10-25 DIAGNOSIS — L50.9 HIVE: ICD-10-CM

## 2024-10-25 DIAGNOSIS — E61.1 IRON DEFICIENCY: ICD-10-CM

## 2024-10-25 LAB
ALBUMIN SERPL-MCNC: 3.1 G/DL (ref 3.5–5.2)
ALBUMIN/GLOB SERPL: 0.8 G/DL
ALP SERPL-CCNC: 82 U/L (ref 39–117)
ALT SERPL W P-5'-P-CCNC: 38 U/L (ref 1–33)
ANION GAP SERPL CALCULATED.3IONS-SCNC: 8.3 MMOL/L (ref 5–15)
AST SERPL-CCNC: 45 U/L (ref 1–32)
BASOPHILS # BLD AUTO: 0.05 10*3/MM3 (ref 0–0.2)
BASOPHILS NFR BLD AUTO: 0.5 % (ref 0–1.5)
BILIRUB SERPL-MCNC: 0.3 MG/DL (ref 0–1.2)
BUN SERPL-MCNC: 10 MG/DL (ref 6–20)
BUN/CREAT SERPL: 12.2 (ref 7–25)
CALCIUM SPEC-SCNC: 9.1 MG/DL (ref 8.6–10.5)
CHLORIDE SERPL-SCNC: 103 MMOL/L (ref 98–107)
CO2 SERPL-SCNC: 28.7 MMOL/L (ref 22–29)
CREAT SERPL-MCNC: 0.82 MG/DL (ref 0.57–1)
CRP SERPL-MCNC: 4.54 MG/DL (ref 0–0.5)
DEPRECATED RDW RBC AUTO: 44.4 FL (ref 37–54)
EGFRCR SERPLBLD CKD-EPI 2021: 99.4 ML/MIN/1.73
EOSINOPHIL # BLD AUTO: 0.25 10*3/MM3 (ref 0–0.4)
EOSINOPHIL NFR BLD AUTO: 2.6 % (ref 0.3–6.2)
ERYTHROCYTE [DISTWIDTH] IN BLOOD BY AUTOMATED COUNT: 13.8 % (ref 12.3–15.4)
ERYTHROCYTE [SEDIMENTATION RATE] IN BLOOD: 23 MM/HR (ref 0–20)
GLOBULIN UR ELPH-MCNC: 4 GM/DL
GLUCOSE SERPL-MCNC: 99 MG/DL (ref 65–99)
HCT VFR BLD AUTO: 37.8 % (ref 34–46.6)
HGB BLD-MCNC: 12 G/DL (ref 12–15.9)
IMM GRANULOCYTES # BLD AUTO: 0.43 10*3/MM3 (ref 0–0.05)
IMM GRANULOCYTES NFR BLD AUTO: 4.5 % (ref 0–0.5)
IRON 24H UR-MRATE: 47 MCG/DL (ref 37–145)
IRON SATN MFR SERPL: 16 % (ref 20–50)
LYMPHOCYTES # BLD AUTO: 2.89 10*3/MM3 (ref 0.7–3.1)
LYMPHOCYTES NFR BLD AUTO: 30 % (ref 19.6–45.3)
MCH RBC QN AUTO: 27.8 PG (ref 26.6–33)
MCHC RBC AUTO-ENTMCNC: 31.7 G/DL (ref 31.5–35.7)
MCV RBC AUTO: 87.7 FL (ref 79–97)
MONOCYTES # BLD AUTO: 0.6 10*3/MM3 (ref 0.1–0.9)
MONOCYTES NFR BLD AUTO: 6.2 % (ref 5–12)
NEUTROPHILS NFR BLD AUTO: 5.4 10*3/MM3 (ref 1.7–7)
NEUTROPHILS NFR BLD AUTO: 56.2 % (ref 42.7–76)
NRBC BLD AUTO-RTO: 0 /100 WBC (ref 0–0.2)
PLATELET # BLD AUTO: 342 10*3/MM3 (ref 140–450)
PMV BLD AUTO: 9.7 FL (ref 6–12)
POTASSIUM SERPL-SCNC: 4.2 MMOL/L (ref 3.5–5.2)
PROT SERPL-MCNC: 7.1 G/DL (ref 6–8.5)
RBC # BLD AUTO: 4.31 10*6/MM3 (ref 3.77–5.28)
SODIUM SERPL-SCNC: 140 MMOL/L (ref 136–145)
TIBC SERPL-MCNC: 301 MCG/DL (ref 298–536)
TRANSFERRIN SERPL-MCNC: 202 MG/DL (ref 200–360)
TSH SERPL DL<=0.05 MIU/L-ACNC: 2.15 UIU/ML (ref 0.27–4.2)
WBC NRBC COR # BLD AUTO: 9.62 10*3/MM3 (ref 3.4–10.8)

## 2024-10-25 PROCEDURE — 86003 ALLG SPEC IGE CRUDE XTRC EA: CPT | Performed by: INTERNAL MEDICINE

## 2024-10-25 PROCEDURE — 86235 NUCLEAR ANTIGEN ANTIBODY: CPT | Performed by: INTERNAL MEDICINE

## 2024-10-25 PROCEDURE — 83540 ASSAY OF IRON: CPT | Performed by: INTERNAL MEDICINE

## 2024-10-25 PROCEDURE — 80050 GENERAL HEALTH PANEL: CPT | Performed by: INTERNAL MEDICINE

## 2024-10-25 PROCEDURE — 85652 RBC SED RATE AUTOMATED: CPT | Performed by: INTERNAL MEDICINE

## 2024-10-25 PROCEDURE — 86008 ALLG SPEC IGE RECOMB EA: CPT | Performed by: INTERNAL MEDICINE

## 2024-10-25 PROCEDURE — 86225 DNA ANTIBODY NATIVE: CPT | Performed by: INTERNAL MEDICINE

## 2024-10-25 PROCEDURE — 84466 ASSAY OF TRANSFERRIN: CPT | Performed by: INTERNAL MEDICINE

## 2024-10-25 PROCEDURE — 82785 ASSAY OF IGE: CPT | Performed by: INTERNAL MEDICINE

## 2024-10-25 PROCEDURE — 86140 C-REACTIVE PROTEIN: CPT | Performed by: INTERNAL MEDICINE

## 2024-10-25 PROCEDURE — 86038 ANTINUCLEAR ANTIBODIES: CPT | Performed by: INTERNAL MEDICINE

## 2024-10-25 RX ORDER — PREDNISONE 20 MG/1
40 TABLET ORAL DAILY
Qty: 10 TABLET | Refills: 0 | Status: SHIPPED | OUTPATIENT
Start: 2024-10-25 | End: 2024-10-30

## 2024-10-25 RX ORDER — ALBUTEROL SULFATE 90 UG/1
2 INHALANT RESPIRATORY (INHALATION) EVERY 4 HOURS PRN
Qty: 6.7 G | Refills: 1 | Status: SHIPPED | OUTPATIENT
Start: 2024-10-25

## 2024-10-25 NOTE — PROGRESS NOTES
"Chief Complaint  Anxiety (3 mo f/u), Cough (Would like medicine ), Ear Fullness, Rash (Breaks out randomly over body starting Monday ), and Allergic Reaction (Lip swelling after drinking orange juice)      Subjective      History of Present Illness  The patient presents for evaluation of COVID-19.    She reports that her current COVID-19 symptoms are more severe than her previous experience with the virus. She has been unable to work since returning from vacation, managing only three days of work in total. Her symptoms include difficulty walking, persistent coughing, and loss of appetite, which she believes has led to weight loss. She has not experienced a loss of taste or smell. She describes a sensation of having fullness in her ears for over a week, which has affected her hearing. She had a fever last Wednesday and has been experiencing headaches due to the coughing. Despite taking cough syrup, her cough persists. She has no known history of asthma. She also reports daily coughing spells lasting about 5 minutes. She is currently taking Trintellix.    Her Bell's palsy is doing better. She does not have any droopiness, but every now and then she feels it when she is tired or something aggravates her. The last time she took prednisone, it made her blood pressure go up.             Objective   Vital Signs:   Vitals:    10/25/24 1608   BP: 120/66   BP Location: Left arm   Patient Position: Sitting   Cuff Size: Large Adult   Pulse: 102   Resp: 22   Temp: 98.3 °F (36.8 °C)   TempSrc: Temporal   SpO2: 96%   Weight: 89 kg (196 lb 3.2 oz)   Height: 152.4 cm (60\")     Body mass index is 38.32 kg/m².    Wt Readings from Last 3 Encounters:   10/25/24 89 kg (196 lb 3.2 oz)   10/21/24 89.2 kg (196 lb 11.2 oz)   10/16/24 91.4 kg (201 lb 8 oz)     BP Readings from Last 3 Encounters:   10/25/24 120/66   10/21/24 128/72   10/16/24 151/92       Health Maintenance   Topic Date Due    HEPATITIS C SCREENING  Never done    ANNUAL " PHYSICAL  Never done    PAP SMEAR  07/06/2021    INFLUENZA VACCINE  08/01/2024    COVID-19 Vaccine (1 - 2024-25 season) Never done    BMI FOLLOWUP  10/25/2025    TDAP/TD VACCINES (3 - Td or Tdap) 04/06/2026    Pneumococcal Vaccine 0-64  Aged Out       Physical Exam  Vitals reviewed.   Constitutional:       Appearance: Normal appearance. She is well-developed.      Comments: Ill-appearing   HENT:      Head: Normocephalic and atraumatic.      Right Ear: External ear normal.      Left Ear: External ear normal.   Eyes:      Conjunctiva/sclera: Conjunctivae normal.      Pupils: Pupils are equal, round, and reactive to light.   Cardiovascular:      Rate and Rhythm: Normal rate and regular rhythm.      Heart sounds: No murmur heard.     No friction rub. No gallop.   Pulmonary:      Effort: Pulmonary effort is normal.      Breath sounds: Wheezing present. No rhonchi.   Skin:     General: Skin is warm and dry.   Neurological:      Mental Status: She is alert and oriented to person, place, and time.   Psychiatric:         Mood and Affect: Affect normal.         Behavior: Behavior normal.         Thought Content: Thought content normal.        Physical Exam        Result Review :  The following data was reviewed by: Tere Em MD on 10/25/2024:         Results             Procedures            Assessment & Plan  Swelling    Hive    Iron deficiency    Wheezing    COVID-19 virus detected      Orders Placed This Encounter   Procedures    XR Chest PA & Lateral    Iron and TIBC    Alpha-Gal IgE Panel    Comprehensive metabolic panel    ROSALIA by IFA, Reflex 9-biomarkers profile    IgE    Sedimentation Rate    C-reactive protein    TSH    CBC Auto Differential    IDA+DNA/DS+Antich+Centro+FA..    CBC w AUTO Differential     New Medications Ordered This Visit   Medications    amoxicillin-clavulanate (AUGMENTIN) 875-125 MG per tablet     Sig: Take 1 tablet by mouth 2 (Two) Times a Day for 10 days.     Dispense:  20 tablet      Refill:  0    predniSONE (DELTASONE) 20 MG tablet     Sig: Take 2 tablets by mouth Daily for 5 days.     Dispense:  10 tablet     Refill:  0    albuterol sulfate  (90 Base) MCG/ACT inhaler     Sig: Inhale 2 puffs Every 4 (Four) Hours As Needed for Wheezing.     Dispense:  6.7 g     Refill:  1          Assessment & Plan  1. COVID-19.  The patient's symptoms and clinical presentation are consistent with a COVID-19 infection. She reported severe symptoms, including persistent coughing, wheezing, and ear discomfort. A prescription for a 5-day course of steroids (40 mg) was provided to alleviate lung inflammation. A chest x-ray was ordered, but it is not mandatory if her condition improves. Augmentin was prescribed to treat a potential ear infection and pneumonia. An albuterol inhaler was also prescribed for use as needed, with caution advised due to potential increases in heart rate and blood pressure. Blood work was ordered to investigate the cause of her lip and leg swelling. She was advised to receive an influenza vaccine once her condition improves. If her condition does not improve, she should proceed with the chest x-ray.    2. Ear Infection.  The patient reported ear discomfort and hearing loss. Augmentin was prescribed to treat the ear infection. She was advised to monitor her symptoms and follow up if there is no improvement.    3. Wheezing.  The patient exhibited wheezing, which may be related to COVID-19 or a mild form of asthma. An albuterol inhaler was prescribed for use as needed, with caution advised due to potential increases in heart rate and blood pressure. She was instructed to use the inhaler during coughing spells.    4. Medication Management.  The patient was advised to continue using Trintellix as it is working well for her.    Follow-up  Return in 2 weeks for follow-up.    Patient or patient representative verbalized consent for the use of Ambient Listening during the visit with  Tere  Priscilla Em MD for chart documentation. 11/7/2024  16:44 EST      FOLLOW UP  Return in about 2 weeks (around 11/8/2024).  Patient was given instructions and counseling regarding her condition or for health maintenance advice. Please see specific information pulled into the AVS if appropriate.     Tere Em MD  11/07/24  16:44 EST    CURRENT & DISCONTINUED MEDICATIONS  Current Outpatient Medications   Medication Instructions    albuterol sulfate  (90 Base) MCG/ACT inhaler 2 puffs, Inhalation, Every 4 Hours PRN    SUMAtriptan (IMITREX) 25 MG tablet Take one tablet at onset of headache. May repeat dose one time in 2 hours if headache not relieved.    Vortioxetine HBr (TRINTELLIX) 10 mg, Oral, Daily With Breakfast       Medications Discontinued During This Encounter   Medication Reason    brompheniramine-pseudoephedrine-DM 30-2-10 MG/5ML syrup

## 2024-10-25 NOTE — LETTER
October 25, 2024     Patient: Paradise Wilson   YOB: 1995   Date of Visit: 10/25/2024       To Whom It May Concern:    Paradise was seen in my office on 10/25/2024. It is my medical opinion that Paradise Wilson may return to work in one day.            Sincerely,        Tere Em MD

## 2024-10-30 LAB
ANA SER QL IF: POSITIVE
ANA SPECKLED TITR SER: NORMAL {TITER}
CENTROMERE B AB SER-ACNC: <0.2 AI (ref 0–0.9)
CHROMATIN AB SERPL-ACNC: <0.2 AI (ref 0–0.9)
DSDNA AB SER-ACNC: 1 IU/ML (ref 0–9)
ENA JO1 AB SER-ACNC: <0.2 AI (ref 0–0.9)
ENA RNP AB SER-ACNC: 0.2 AI (ref 0–0.9)
ENA SCL70 AB SER-ACNC: <0.2 AI (ref 0–0.9)
ENA SM AB SER-ACNC: <0.2 AI (ref 0–0.9)
ENA SS-A AB SER-ACNC: 0.4 AI (ref 0–0.9)
ENA SS-B AB SER-ACNC: <0.2 AI (ref 0–0.9)
LABORATORY COMMENT REPORT: ABNORMAL
Lab: NORMAL
Lab: NORMAL

## 2024-11-01 LAB
ALPHA-GAL IGE QN: <0.1 KU/L
BEEF IGE QN: <0.1 KU/L
CONV CLASS DESCRIPTION: NORMAL
IGE SERPL-ACNC: 8 IU/ML (ref 6–495)
LAMB IGE QN: <0.1 KU/L
PORK IGE QN: <0.1 KU/L

## 2025-01-30 RX ORDER — VORTIOXETINE 10 MG/1
10 TABLET, FILM COATED ORAL
Qty: 90 TABLET | Refills: 1 | Status: SHIPPED | OUTPATIENT
Start: 2025-01-30

## 2025-02-05 ENCOUNTER — OFFICE VISIT (OUTPATIENT)
Dept: INTERNAL MEDICINE | Facility: CLINIC | Age: 30
End: 2025-02-05
Payer: COMMERCIAL

## 2025-02-05 VITALS
BODY MASS INDEX: 38.4 KG/M2 | RESPIRATION RATE: 18 BRPM | DIASTOLIC BLOOD PRESSURE: 80 MMHG | HEIGHT: 60 IN | TEMPERATURE: 97.6 F | SYSTOLIC BLOOD PRESSURE: 126 MMHG | HEART RATE: 77 BPM | OXYGEN SATURATION: 100 % | WEIGHT: 195.6 LBS

## 2025-02-05 DIAGNOSIS — R74.8 ELEVATED LIVER ENZYMES: ICD-10-CM

## 2025-02-05 DIAGNOSIS — F41.9 ANXIETY: ICD-10-CM

## 2025-02-05 DIAGNOSIS — E66.9 OBESITY WITH SERIOUS COMORBIDITY, UNSPECIFIED CLASS, UNSPECIFIED OBESITY TYPE: ICD-10-CM

## 2025-02-05 DIAGNOSIS — R06.81 APNEA: Primary | ICD-10-CM

## 2025-02-05 PROBLEM — J01.00 ACUTE NON-RECURRENT MAXILLARY SINUSITIS: Status: RESOLVED | Noted: 2023-12-18 | Resolved: 2025-02-05

## 2025-02-05 PROCEDURE — 90656 IIV3 VACC NO PRSV 0.5 ML IM: CPT | Performed by: INTERNAL MEDICINE

## 2025-02-05 PROCEDURE — 90471 IMMUNIZATION ADMIN: CPT | Performed by: INTERNAL MEDICINE

## 2025-02-05 PROCEDURE — 99214 OFFICE O/P EST MOD 30 MIN: CPT | Performed by: INTERNAL MEDICINE

## 2025-02-05 NOTE — PROGRESS NOTES
"Chief Complaint  Anxiety (3 mo f/u /) and Obesity (Would like to discuss Zepbound)      Subjective      History of Present Illness  The patient presents for weight management, elevated liver enzymes, sleep apnea, and medication management.    She is interested in Zepbound therapy, motivated by her mother-in-law's 20-pound weight loss over 3 months. Her insurance will cover the medication after verification. She has been going to the gym since [place] but struggles to maintain this due to fatigue from work and household duties.     Her Bell's palsy has resolved, attributed to stress management.    She experiences sleep apnea, observed by her , and seeks weight loss strategies. She avoids supine sleeping but struggles due to a shoulder injury.    She is on Trintellix, which is effective.          Objective   Vital Signs:   Vitals:    02/05/25 1625   BP: 126/80   BP Location: Left arm   Patient Position: Sitting   Cuff Size: Adult   Pulse: 77   Resp: 18   Temp: 97.6 °F (36.4 °C)   TempSrc: Temporal   SpO2: 100%   Weight: 88.7 kg (195 lb 9.6 oz)   Height: 152.4 cm (60\")     Body mass index is 38.2 kg/m².    Wt Readings from Last 3 Encounters:   02/05/25 88.7 kg (195 lb 9.6 oz)   10/25/24 89 kg (196 lb 3.2 oz)   10/21/24 89.2 kg (196 lb 11.2 oz)     BP Readings from Last 3 Encounters:   02/05/25 126/80   10/25/24 120/66   10/21/24 128/72       Health Maintenance   Topic Date Due    HEPATITIS C SCREENING  Never done    ANNUAL PHYSICAL  Never done    PAP SMEAR  07/06/2021    COVID-19 Vaccine (1 - 2024-25 season) Never done    BMI FOLLOWUP  02/05/2026    TDAP/TD VACCINES (3 - Td or Tdap) 04/06/2026    INFLUENZA VACCINE  Completed    Pneumococcal Vaccine 0-64  Aged Out    HEMOGLOBIN A1C  Discontinued       Physical Exam  Vitals reviewed.   Constitutional:       Appearance: Normal appearance. She is well-developed. She is obese.   HENT:      Head: Normocephalic and atraumatic.      Right Ear: External ear normal.     "  Left Ear: External ear normal.   Eyes:      Conjunctiva/sclera: Conjunctivae normal.      Pupils: Pupils are equal, round, and reactive to light.   Cardiovascular:      Rate and Rhythm: Normal rate and regular rhythm.      Heart sounds: No murmur heard.     No friction rub. No gallop.   Pulmonary:      Effort: Pulmonary effort is normal.      Breath sounds: Normal breath sounds. No wheezing or rhonchi.   Skin:     General: Skin is warm and dry.   Neurological:      Mental Status: She is alert and oriented to person, place, and time.   Psychiatric:         Mood and Affect: Affect normal.         Behavior: Behavior normal.         Thought Content: Thought content normal.        Physical Exam        Result Review :  The following data was reviewed by: Tere Em MD on 02/05/2025:         Results  Reviewed prior labs            Procedures            Assessment & Plan  Apnea         Elevated liver enzymes         Obesity with serious comorbidity, unspecified class, unspecified obesity type         Anxiety              Assessment & Plan  Weight management  - BP normal  - No history of PCOS  - Mildly elevated liver enzymes may qualify her for Zepbound  - Advised healthy diet and regular exercise to prevent progression to cirrhosis  - Prescribed Zepbound, 2.5 mg weekly  - Increase dose after 4 weeks if tolerated  - Maintain or discontinue if adverse effects (nausea, diarrhea, constipation) occur  - Advised fiber gummies for constipation  - Update via [place] in 3 weeks    Sleep apnea  - Reported apnea episodes led to gym attendance and avoiding supine sleeping  - Consider sleep specialist referral if episodes persist    Medication management  - Refill for Trintellix provided  - Submit MyChart request if Trintellix is unavailable    Health maintenance  - Influenza vaccine today    Follow-up in 3 months      [phone]; number to call to help get meds approved      Patient or patient representative verbalized  consent for the use of Ambient Listening during the visit with  Tere Em MD for chart documentation. 2/6/2025  17:18 EST      FOLLOW UP  Return in about 3 months (around 5/5/2025).  Patient was given instructions and counseling regarding her condition or for health maintenance advice. Please see specific information pulled into the AVS if appropriate.     Tere Em MD  02/06/25  20:02 EST    CURRENT & DISCONTINUED MEDICATIONS  Current Outpatient Medications   Medication Instructions    albuterol sulfate  (90 Base) MCG/ACT inhaler 2 puffs, Inhalation, Every 4 Hours PRN    SUMAtriptan (IMITREX) 25 MG tablet Take one tablet at onset of headache. May repeat dose one time in 2 hours if headache not relieved.    Tirzepatide-Weight Management (ZEPBOUND) 2.5 mg, Subcutaneous, Weekly    Vortioxetine HBr (TRINTELLIX) 10 mg, Oral, Daily With Breakfast       Medications Discontinued During This Encounter   Medication Reason    Trintellix 10 MG tablet tablet Reorder

## 2025-02-06 ENCOUNTER — PRIOR AUTHORIZATION (OUTPATIENT)
Dept: INTERNAL MEDICINE | Facility: CLINIC | Age: 30
End: 2025-02-06
Payer: COMMERCIAL

## 2025-02-06 ENCOUNTER — TELEPHONE (OUTPATIENT)
Dept: INTERNAL MEDICINE | Facility: CLINIC | Age: 30
End: 2025-02-06
Payer: COMMERCIAL

## 2025-02-06 PROBLEM — R74.8 ELEVATED LIVER ENZYMES: Status: ACTIVE | Noted: 2025-02-06

## 2025-02-06 PROBLEM — E66.9 OBESITY WITH SERIOUS COMORBIDITY: Status: ACTIVE | Noted: 2025-02-06

## 2025-02-06 NOTE — TELEPHONE ENCOUNTER
Caller: FLORIDA SCRIPT    Relationship to patient: Other    Best call back number: [phone]    MARGOT IS CALLING TO SEE IF OFFICE HAS RECEIVED A PA FOR MEDICATION ZEPBOUND.

## 2025-02-06 NOTE — TELEPHONE ENCOUNTER
Caller: Paradise Wilson    Relationship to patient: Self    Best call back number: [phone]     Patient is needing: CALLER STATED THAT THE PHARMACY [facility] SCRIPTS THAT THE ZEPBOUND REQUIRES MEDICATION PRIOR AUTHORIZATION AND FOR THE PRESCRIPTION TO BE RESENT.   CALLER STATED THAT THE PHARMACY SENT FORMS AND SHE IS WANTING TO CONFIRM THESE HAVE BEEN RECEIVED.

## 2025-02-06 NOTE — TELEPHONE ENCOUNTER
Zepbound 2.5MG/0.5ML pen-injectors    Drug is covered by current benefit plan. No further PA activity needed.

## 2025-02-07 NOTE — TELEPHONE ENCOUNTER
Caller: Paradise Wilson     Relationship to patient: Other    Best call back number: [phone]    PATIENT IS REQUESTING A CALL BACK REGARDING PA STATUS.

## 2025-02-07 NOTE — TELEPHONE ENCOUNTER
Spoke with patient and inform her that we are waiting on her insurance for approval, pt verbalized understanding.

## 2025-02-24 ENCOUNTER — OFFICE VISIT (OUTPATIENT)
Dept: INTERNAL MEDICINE | Facility: CLINIC | Age: 30
End: 2025-02-24
Payer: COMMERCIAL

## 2025-02-24 VITALS
HEIGHT: 60 IN | DIASTOLIC BLOOD PRESSURE: 62 MMHG | SYSTOLIC BLOOD PRESSURE: 120 MMHG | HEART RATE: 100 BPM | OXYGEN SATURATION: 100 % | BODY MASS INDEX: 38.01 KG/M2 | TEMPERATURE: 97.8 F | WEIGHT: 193.6 LBS

## 2025-02-24 DIAGNOSIS — N89.8 SKIN TAG OF VAGINAL MUCOSA: Primary | ICD-10-CM

## 2025-02-24 PROCEDURE — 99214 OFFICE O/P EST MOD 30 MIN: CPT | Performed by: PHYSICIAN ASSISTANT

## 2025-02-24 PROCEDURE — 88304 TISSUE EXAM BY PATHOLOGIST: CPT | Performed by: PHYSICIAN ASSISTANT

## 2025-02-24 RX ORDER — LIDOCAINE HYDROCHLORIDE AND EPINEPHRINE 10; 10 MG/ML; UG/ML
5 INJECTION, SOLUTION INFILTRATION; PERINEURAL ONCE
Status: SHIPPED | OUTPATIENT
Start: 2025-02-24

## 2025-02-24 NOTE — PROGRESS NOTES
"Chief Complaint  Vaginal Pain (Patient states its like a skin tag, but it bleed and it is in painful. Patient denies any uti symptoms)    Subjective          Paradise Wilson presents to [facility]    Vaginal tag- patient states that she has a lesion in her vaginal area that has been there for a few weeks but seems to be getting larger and bothersome because it sticks to her underwear.  She denies having this issue before.  6 years since her previous pelvic exam.  No abnormal bleeding.  No pain with intercourse.      Objective   Vital Signs:   /62   Pulse 100   Temp 97.8 °F (36.6 °C)   Ht 152.4 cm (60\")   Wt 87.8 kg (193 lb 9.6 oz)   SpO2 100%   BMI 37.81 kg/m²     Physical Exam  Vitals reviewed.   Constitutional:       Appearance: Normal appearance. She is well-developed.   HENT:      Head: Normocephalic and atraumatic.   Eyes:      Conjunctiva/sclera: Conjunctivae normal.      Pupils: Pupils are equal, round, and reactive to light.   Cardiovascular:      Rate and Rhythm: Normal rate and regular rhythm.      Heart sounds: No murmur heard.     No friction rub. No gallop.   Pulmonary:      Effort: Pulmonary effort is normal.      Breath sounds: Normal breath sounds. No wheezing or rhonchi.   Genitourinary:         Comments: Epidermal inclusion cyst connected by thin base on right labia minor.    Skin:     General: Skin is warm and dry.   Neurological:      Mental Status: She is alert and oriented to person, place, and time.      Cranial Nerves: No cranial nerve deficit.   Psychiatric:         Mood and Affect: Mood and affect normal.         Behavior: Behavior normal.         Thought Content: Thought content normal.         Judgment: Judgment normal.        Result Review :          Skin Tag Removal    Date/Time: 2/25/2025 8:46 AM    Performed by: Anali Bueno PA-C  Authorized by: Anali Bueno PA-C  Local anesthesia used: yes  Anesthesia: local " infiltration    Anesthesia:  Local anesthesia used: yes  Local Anesthetic: lidocaine 1% with epinephrine  Anesthetic total: 1 mL    Sedation:  Patient sedated: no    Patient tolerance: patient tolerated the procedure well with no immediate complications  Comments: Area prepped with iodine, skin tag removed at the base by sterile surgical forceps. Direct pressure was held and additional bleeding was stopped by silver nitrate.  Vaseline was placed on the area.             Assessment and Plan    Diagnoses and all orders for this visit:    1. Skin tag of vaginal mucosa (Primary)  Assessment & Plan:  Skin tag removed by sterile forceps.  Minimal bleeding controlled by direct pressure and silver nitrate.  Vaseline placed on lesion.  Patient tolerated procedure.  Avoid sexual contact or tampons for the next 1-2 weeks until area has healed.  Specimen sent to pathology.  Monitor for new or worsening symptoms.  Call for any questions or concerns.  Patient to schedule pap smear with pcp at next follow up visit.     Orders:  -     lidocaine 1% - EPINEPHrine [id number] (XYLOCAINE W/EPI) 1 %-1:032611 injection 5 mL  -     Tissue Pathology Exam    Other orders  -     Skin Tag Removal              Follow Up   No follow-ups on file.  Patient was given instructions and counseling regarding her condition or for health maintenance advice. Please see specific information pulled into the AVS if appropriate.

## 2025-02-25 PROBLEM — N89.8 SKIN TAG OF VAGINAL MUCOSA: Status: ACTIVE | Noted: 2025-02-25

## 2025-02-25 NOTE — ASSESSMENT & PLAN NOTE
Skin tag removed by sterile forceps.  Minimal bleeding controlled by direct pressure and silver nitrate.  Vaseline placed on lesion.  Patient tolerated procedure.  Avoid sexual contact or tampons for the next 1-2 weeks until area has healed.  Specimen sent to pathology.  Monitor for new or worsening symptoms.  Call for any questions or concerns.  Patient to schedule pap smear with pcp at next follow up visit.

## 2025-02-26 LAB
CYTO UR: NORMAL
LAB AP CASE REPORT: NORMAL
LAB AP CLINICAL INFORMATION: NORMAL
PATH REPORT.FINAL DX SPEC: NORMAL
PATH REPORT.GROSS SPEC: NORMAL

## 2025-03-21 ENCOUNTER — PATIENT MESSAGE (OUTPATIENT)
Dept: INTERNAL MEDICINE | Facility: CLINIC | Age: 30
End: 2025-03-21
Payer: COMMERCIAL

## 2025-03-25 ENCOUNTER — TELEPHONE (OUTPATIENT)
Dept: INTERNAL MEDICINE | Facility: CLINIC | Age: 30
End: 2025-03-25
Payer: COMMERCIAL

## 2025-03-25 NOTE — TELEPHONE ENCOUNTER
Caller: Paradise Wilson    Relationship: Self    Best call back number: [phone]     What medication are you requesting: ZEPBOUND NEXT DOSE UP        If a prescription is needed, what is your preferred pharmacy and phone number: [id number] - [place], [place] - [address] AT [facility] - [phone]  - [phone]     Additional notes:PATIENT CALLED ASKING FOR AN UPDATE AND WILL THE ZEPBOUND NEXT DOSE UP BE SENT IN AND COULD THIS BE SENT WITH REFILLS

## 2025-03-26 NOTE — TELEPHONE ENCOUNTER
Called and spoke with pt, explained to pt provider has sent in the next dose of medication to the pharmacy, pt verbalized understanding and had no further questions at this time.

## 2025-05-07 ENCOUNTER — OFFICE VISIT (OUTPATIENT)
Dept: INTERNAL MEDICINE | Facility: CLINIC | Age: 30
End: 2025-05-07
Payer: COMMERCIAL

## 2025-05-07 VITALS
RESPIRATION RATE: 20 BRPM | WEIGHT: 183.6 LBS | HEIGHT: 60 IN | SYSTOLIC BLOOD PRESSURE: 110 MMHG | TEMPERATURE: 98 F | OXYGEN SATURATION: 100 % | HEART RATE: 91 BPM | BODY MASS INDEX: 36.05 KG/M2 | DIASTOLIC BLOOD PRESSURE: 72 MMHG

## 2025-05-07 DIAGNOSIS — Z12.4 SCREENING FOR CERVICAL CANCER: ICD-10-CM

## 2025-05-07 DIAGNOSIS — R74.8 ELEVATED LIVER ENZYMES: ICD-10-CM

## 2025-05-07 DIAGNOSIS — Z01.419 WELL WOMAN EXAM: Primary | ICD-10-CM

## 2025-05-07 DIAGNOSIS — F41.9 ANXIETY: ICD-10-CM

## 2025-05-07 DIAGNOSIS — N89.8 VAGINAL DISCHARGE: ICD-10-CM

## 2025-05-07 DIAGNOSIS — Z13.220 SCREENING, LIPID: ICD-10-CM

## 2025-05-07 LAB
ALBUMIN SERPL-MCNC: 4.6 G/DL (ref 3.5–5.2)
ALBUMIN/GLOB SERPL: 1.6 G/DL
ALP SERPL-CCNC: 81 U/L (ref 39–117)
ALT SERPL W P-5'-P-CCNC: 22 U/L (ref 1–33)
ANION GAP SERPL CALCULATED.3IONS-SCNC: 9.1 MMOL/L (ref 5–15)
AST SERPL-CCNC: 38 U/L (ref 1–32)
BASOPHILS # BLD AUTO: 0.05 10*3/MM3 (ref 0–0.2)
BASOPHILS NFR BLD AUTO: 0.5 % (ref 0–1.5)
BILIRUB SERPL-MCNC: 0.4 MG/DL (ref 0–1.2)
BUN SERPL-MCNC: 12 MG/DL (ref 6–20)
BUN/CREAT SERPL: 15.8 (ref 7–25)
CALCIUM SPEC-SCNC: 9.5 MG/DL (ref 8.6–10.5)
CANDIDA SPECIES: NEGATIVE
CHLORIDE SERPL-SCNC: 101 MMOL/L (ref 98–107)
CHOLEST SERPL-MCNC: 171 MG/DL (ref 0–200)
CO2 SERPL-SCNC: 26.9 MMOL/L (ref 22–29)
CREAT SERPL-MCNC: 0.76 MG/DL (ref 0.57–1)
DEPRECATED RDW RBC AUTO: 43.6 FL (ref 37–54)
EGFRCR SERPLBLD CKD-EPI 2021: 108.9 ML/MIN/1.73
EOSINOPHIL # BLD AUTO: 0.19 10*3/MM3 (ref 0–0.4)
EOSINOPHIL NFR BLD AUTO: 2.1 % (ref 0.3–6.2)
ERYTHROCYTE [DISTWIDTH] IN BLOOD BY AUTOMATED COUNT: 13.8 % (ref 12.3–15.4)
GARDNERELLA VAGINALIS: POSITIVE
GLOBULIN UR ELPH-MCNC: 2.9 GM/DL
GLUCOSE SERPL-MCNC: 97 MG/DL (ref 65–99)
HCT VFR BLD AUTO: 41.5 % (ref 34–46.6)
HDLC SERPL-MCNC: 39 MG/DL (ref 40–60)
HGB BLD-MCNC: 13.8 G/DL (ref 12–15.9)
IMM GRANULOCYTES # BLD AUTO: 0.02 10*3/MM3 (ref 0–0.05)
IMM GRANULOCYTES NFR BLD AUTO: 0.2 % (ref 0–0.5)
LDLC SERPL CALC-MCNC: 89 MG/DL (ref 0–100)
LDLC/HDLC SERPL: 2.07 {RATIO}
LYMPHOCYTES # BLD AUTO: 3.15 10*3/MM3 (ref 0.7–3.1)
LYMPHOCYTES NFR BLD AUTO: 34.4 % (ref 19.6–45.3)
MCH RBC QN AUTO: 29.1 PG (ref 26.6–33)
MCHC RBC AUTO-ENTMCNC: 33.3 G/DL (ref 31.5–35.7)
MCV RBC AUTO: 87.4 FL (ref 79–97)
MONOCYTES # BLD AUTO: 0.82 10*3/MM3 (ref 0.1–0.9)
MONOCYTES NFR BLD AUTO: 9 % (ref 5–12)
NEUTROPHILS NFR BLD AUTO: 4.93 10*3/MM3 (ref 1.7–7)
NEUTROPHILS NFR BLD AUTO: 53.8 % (ref 42.7–76)
NRBC BLD AUTO-RTO: 0 /100 WBC (ref 0–0.2)
PLATELET # BLD AUTO: 245 10*3/MM3 (ref 140–450)
PMV BLD AUTO: 10.7 FL (ref 6–12)
POTASSIUM SERPL-SCNC: 4.3 MMOL/L (ref 3.5–5.2)
PROT SERPL-MCNC: 7.5 G/DL (ref 6–8.5)
RBC # BLD AUTO: 4.75 10*6/MM3 (ref 3.77–5.28)
SODIUM SERPL-SCNC: 137 MMOL/L (ref 136–145)
T VAGINALIS DNA VAG QL PROBE+SIG AMP: NEGATIVE
TRIGL SERPL-MCNC: 256 MG/DL (ref 0–150)
TSH SERPL DL<=0.05 MIU/L-ACNC: 1.93 UIU/ML (ref 0.27–4.2)
VLDLC SERPL-MCNC: 43 MG/DL (ref 5–40)
WBC NRBC COR # BLD AUTO: 9.16 10*3/MM3 (ref 3.4–10.8)

## 2025-05-07 PROCEDURE — 87480 CANDIDA DNA DIR PROBE: CPT | Performed by: INTERNAL MEDICINE

## 2025-05-07 PROCEDURE — 80050 GENERAL HEALTH PANEL: CPT | Performed by: INTERNAL MEDICINE

## 2025-05-07 PROCEDURE — 80061 LIPID PANEL: CPT | Performed by: INTERNAL MEDICINE

## 2025-05-07 PROCEDURE — 87510 GARDNER VAG DNA DIR PROBE: CPT | Performed by: INTERNAL MEDICINE

## 2025-05-07 PROCEDURE — 87660 TRICHOMONAS VAGIN DIR PROBE: CPT | Performed by: INTERNAL MEDICINE

## 2025-05-07 PROCEDURE — G0123 SCREEN CERV/VAG THIN LAYER: HCPCS | Performed by: INTERNAL MEDICINE

## 2025-05-07 NOTE — PROGRESS NOTES
Subjective   History of Present Illness    Paradise Wilson is a 29 y.o. female who presents for annual exam.    Obstetric History:  OB History    No obstetric history on file.        History of Present Illness  The patient presents for a well-woman exam.    She has a history of ovarian cysts . She reports two pregnancies, tubal ligation, and heavier menstrual cycles lasting three days, followed by light bleeding and brown discharge.     She experiences cramping during ovulation with variable intensity. Earlier this year, she had severe abdominal pain due to a ruptured cyst.     She has no history of abnormal Pap smears UTD on Gardasil vaccination status.     She reports no new breast lumps, sexual partners, or vaginal discharge except during ovulation.     She experiences urinary leakage with coughing or sneezing and no pain with intercourse. She previously wore a band for varicose veins during pregnancy.    She has been on a weight loss injection for four months, resulting in decreased appetite and a 10-pound weight loss. She maintains an active lifestyle and aims for a target weight of 130 pounds.    She is currently on Trintellix for mental health management, which she reports as effective.    FAMILY HISTORY  - Maternal grandmother had breast cancer  - Mother diagnosed with colon cancer in 2019,  at age 62 in   - Maternal grandfather had lymphoma,  in   - Two older sisters, aged 40 and 39, with no reported issues        Objective     Physical Exam  Vitals reviewed.   Constitutional:       Appearance: Normal appearance. She is well-developed.   HENT:      Head: Normocephalic and atraumatic.      Right Ear: External ear normal.      Left Ear: External ear normal.   Eyes:      Conjunctiva/sclera: Conjunctivae normal.      Pupils: Pupils are equal, round, and reactive to light.   Cardiovascular:      Rate and Rhythm: Normal rate and regular rhythm.      Heart sounds: No murmur heard.     No  "friction rub. No gallop.   Pulmonary:      Effort: Pulmonary effort is normal.      Breath sounds: Normal breath sounds. No wheezing or rhonchi.   Genitourinary:     Comments: Bladder somewhat decreased into field however otherwise normal exam slight excessive vaginal discharge  Skin:     General: Skin is warm and dry.   Neurological:      Mental Status: She is alert and oriented to person, place, and time.   Psychiatric:         Mood and Affect: Affect normal.         Behavior: Behavior normal.         Thought Content: Thought content normal.           /72 (BP Location: Left arm, Patient Position: Sitting, Cuff Size: Adult)   Pulse 91   Temp 98 °F (36.7 °C) (Temporal)   Resp 20   Ht 152.4 cm (60\")   Wt 83.3 kg (183 lb 9.6 oz)   SpO2 100%   BMI 35.86 kg/m²       Assessment & Plan   Diagnoses and all orders for this visit:    1. Well woman exam (Primary)    2. Vaginal discharge  -     Comprehensive Metabolic Panel  -     CBC & Differential  -     Lipid Panel  -     TSH Rfx On Abnormal To Free T4; Future  -     Gardnerella vaginalis, Trichomonas vaginalis, Candida albicans, DNA - Swab, Cervix  -     TSH Rfx On Abnormal To Free T4    3. Screening for cervical cancer  -     IGP,rfx Aptima HPV All Pth    4. Elevated liver enzymes  -     Comprehensive Metabolic Panel  -     CBC & Differential  -     Lipid Panel  -     TSH Rfx On Abnormal To Free T4; Future  -     TSH Rfx On Abnormal To Free T4    5. Anxiety  -     Comprehensive Metabolic Panel  -     CBC & Differential  -     Lipid Panel  -     TSH Rfx On Abnormal To Free T4; Future  -     TSH Rfx On Abnormal To Free T4    6. Screening, lipid  -     Lipid Panel        All questions answered.  Await pap smear results.  Breast self exam technique reviewed and patient encouraged to perform self-exam monthly.  Diagnosis explained in detail, including differential.  Will check basic blood work continue current mental health medications  Continue weight management " as she is doing well with this

## 2025-05-08 ENCOUNTER — RESULTS FOLLOW-UP (OUTPATIENT)
Dept: INTERNAL MEDICINE | Facility: CLINIC | Age: 30
End: 2025-05-08
Payer: COMMERCIAL

## 2025-05-08 RX ORDER — METRONIDAZOLE 500 MG/1
500 TABLET ORAL 2 TIMES DAILY
Qty: 14 TABLET | Refills: 0 | Status: SHIPPED | OUTPATIENT
Start: 2025-05-08 | End: 2025-05-15

## 2025-05-13 LAB
CONV .: NORMAL
CYTOLOGIST CVX/VAG CYTO: NORMAL
CYTOLOGY CVX/VAG DOC CYTO: NORMAL
CYTOLOGY CVX/VAG DOC THIN PREP: NORMAL
DX ICD CODE: NORMAL
OTHER STN SPEC: NORMAL
SERVICE CMNT-IMP: NORMAL
STAT OF ADQ CVX/VAG CYTO-IMP: NORMAL

## 2025-07-07 ENCOUNTER — TELEPHONE (OUTPATIENT)
Dept: INTERNAL MEDICINE | Facility: CLINIC | Age: 30
End: 2025-07-07
Payer: COMMERCIAL

## 2025-07-07 NOTE — TELEPHONE ENCOUNTER
Caller: Paradise Wilson    Relationship: Self    Best call back number: [phone]     What medication are you requesting:     Tirzepatide-Weight Management (ZEPBOUND) 5 MG/0.5ML solution auto-injector       What are your current symptoms: N/A     How long have you been experiencing symptoms: N/A     Have you had these symptoms before:    [x] Yes  [] No    Have you been treated for these symptoms before:   [x] Yes  [] No    If a prescription is needed, what is your preferred pharmacy and phone number:      [id number] [place], [place] - [address] AT [facility] - [phone]  - [phone]  [phone]       Additional notes: PATIENT IS CALLING REQUESTING TO GO UP A DOSAGE ON MEDICATION.

## 2025-07-10 NOTE — TELEPHONE ENCOUNTER
That is fine can increase her to 7.5 mg once a week however please call and confirm that she is not having any nausea constipation or diarrhea.  If so please pend order for me to sign.

## 2025-07-11 NOTE — TELEPHONE ENCOUNTER
Spoke with patient and she stated she is not having any nausea, constipation or diarrhea, please advise

## 2025-07-15 NOTE — TELEPHONE ENCOUNTER
PATIENT CALLED IN ASKING FOR AN UPDATE ON THIS MEDICATION AND TO FIND OUT IF THE INCREASED DOSE IS GOING TO BE SENT TO THE PHARMACY.

## 2025-07-16 NOTE — TELEPHONE ENCOUNTER
Called and spoke with pt, explained to pt medication has been sent to the pharmacy at a higher dose, pt verbalized understanding and had no further questions at this time.

## 2025-07-17 NOTE — TELEPHONE ENCOUNTER
I thought I sent the 7.5mg.  As long as she has taken the 5mg weekly for 4 weeks she can start the 7.5mg

## 2025-07-17 NOTE — TELEPHONE ENCOUNTER
Patient is wanting to know if she should start the 7.5mg next week or if she needs to get the partial refill she has at the pharmacy for the 5mg, please advise

## 2025-08-12 ENCOUNTER — TELEPHONE (OUTPATIENT)
Dept: INTERNAL MEDICINE | Facility: CLINIC | Age: 30
End: 2025-08-12
Payer: COMMERCIAL